# Patient Record
Sex: FEMALE | Race: WHITE | NOT HISPANIC OR LATINO | Employment: PART TIME | URBAN - METROPOLITAN AREA
[De-identification: names, ages, dates, MRNs, and addresses within clinical notes are randomized per-mention and may not be internally consistent; named-entity substitution may affect disease eponyms.]

---

## 2018-01-30 ENCOUNTER — OFFICE VISIT (OUTPATIENT)
Dept: FAMILY MEDICINE CLINIC | Facility: CLINIC | Age: 23
End: 2018-01-30
Payer: COMMERCIAL

## 2018-01-30 VITALS
WEIGHT: 135 LBS | RESPIRATION RATE: 12 BRPM | TEMPERATURE: 97.7 F | DIASTOLIC BLOOD PRESSURE: 70 MMHG | SYSTOLIC BLOOD PRESSURE: 110 MMHG | HEART RATE: 72 BPM | BODY MASS INDEX: 21.69 KG/M2 | HEIGHT: 66 IN

## 2018-01-30 DIAGNOSIS — J06.9 ACUTE URI: Primary | ICD-10-CM

## 2018-01-30 PROCEDURE — 99213 OFFICE O/P EST LOW 20 MIN: CPT | Performed by: NURSE PRACTITIONER

## 2018-01-30 RX ORDER — NORETHINDRONE ACETATE AND ETHINYL ESTRADIOL 1MG-20(21)
KIT ORAL
COMMUNITY
Start: 2015-04-09 | End: 2018-05-18 | Stop reason: HOSPADM

## 2018-01-30 NOTE — PROGRESS NOTES
Subjective     Liseth Jones is a 23 y o  female who presents for evaluation of symptoms of a URI  Symptoms include low grade fever, nasal congestion, non productive cough and fatigue  Onset of symptoms was 3 days ago and has been gradually improving since that time  Treatment to date: none  Positive sick contact=boyfriend had similar illness  The following portions of the patient's history were reviewed and updated as appropriate: allergies, current medications, past family history, past medical history, past social history, past surgical history and problem list     Review of Systems  Constitutional: positive for chills, fatigue and fevers  Ears, nose, mouth, throat, and face: positive for nasal congestion  Respiratory: positive for cough  Cardiovascular: negative  Gastrointestinal: positive for diarrhea  Integument/breast: negative  Musculoskeletal:negative, denies myalgias  Objective     /70 (BP Location: Left arm, Patient Position: Sitting, Cuff Size: Adult)   Pulse 72   Temp 97 7 °F (36 5 °C) (Temporal)   Resp 12   Ht 5' 5 5" (1 664 m)   Wt 61 2 kg (135 lb)   BMI 22 12 kg/m²   General appearance: alert and oriented, in no acute distress  Ears: abnormal TM right ear - dull and abnormal TM left ear - dull  Nose: no discharge, turbinates red, no sinus tenderness  Throat: abnormal findings: mild oropharyngeal erythema  Lungs: clear to auscultation bilaterally  Heart: regular rate and rhythm, S1, S2 normal, no murmur, click, rub or gallop  Abdomen: soft, non-tender; bowel sounds normal; no masses,  no organomegaly  Pulses: 2+ and symmetric  Lymph nodes: Cervical, supraclavicular, and axillary nodes normal     Assessment/Plan     viral upper respiratory illness  Discussed diagnosis and treatment of URI  Discussed the importance of avoiding unnecessary antibiotic therapy  Suggested symptomatic OTC remedies  Nasal saline spray for congestion  Follow up as needed    Call in 7 days if symptoms aren't resolving  Gregg Clinchco

## 2018-01-30 NOTE — LETTER
January 30, 2018     Patient: Byron Larson   YOB: 1995   Date of Visit: 1/30/2018       To Whom it May Concern:    Florence Bernstein is under my professional care  She was seen in my office on 1/30/2018  She may return to work on 2/1/18  Excused for illness 1/27, 1/30, 1/31 for illness  If you have any questions or concerns, please don't hesitate to call           Sincerely,          MARILEE Lim        CC: No Recipients

## 2018-01-30 NOTE — PATIENT INSTRUCTIONS
Upper Respiratory Infection   AMBULATORY CARE:   An upper respiratory infection  is also called a common cold  It can affect your nose, throat, ears, and sinuses  Common signs and symptoms include the following:  Cold symptoms are usually worst for the first 3 to 5 days  You may have any of the following:  · Runny or stuffy nose    · Sneezing and coughing    · Sore throat or hoarseness    · Red, watery, and sore eyes    · Fatigue     · Chills and fever    · Headache, body aches, or sore muscles  Seek care immediately if:   · You have chest pain or trouble breathing  Contact your healthcare provider if:   · You have a fever over 102ºF (39°C)  · Your sore throat gets worse or you see white or yellow spots in your throat  · Your symptoms get worse after 3 to 5 days or your cold is not better in 14 days  · You have a rash anywhere on your skin  · You have large, tender lumps in your neck  · You have thick, green or yellow drainage from your nose  · You cough up thick yellow, green, or bloody mucus  · You have vomiting for more than 24 hours and cannot keep fluids down  · You have a bad earache  · You have questions or concerns about your condition or care  Treatment for a cold: There is no cure for the common cold  Colds are caused by viruses and do not get better with antibiotics  Most people get better in 7 to 14 days  You may continue to cough for 2 to 3 weeks  The following may help decrease your symptoms:  · Decongestants  help reduce nasal congestion and help you breathe more easily  If you take decongestant pills, they may make you feel restless or not able to sleep  Do not use decongestant sprays for more than a few days  · Cough suppressants  help reduce coughing  Ask your healthcare provider which type of cough medicine is best for you  · NSAIDs , such as ibuprofen, help decrease swelling, pain, and fever   NSAIDs can cause stomach bleeding or kidney problems in certain people  If you take blood thinner medicine, always ask your healthcare provider if NSAIDs are safe for you  Always read the medicine label and follow directions  · Acetaminophen  decreases pain and fever  It is available without a doctor's order  Ask how much to take and how often to take it  Follow directions  Read the labels of all other medicines you are using to see if they also contain acetaminophen, or ask your doctor or pharmacist  Acetaminophen can cause liver damage if not taken correctly  Do not use more than 4 grams (4,000 milligrams) total of acetaminophen in one day  Manage your cold:   · Rest as much as possible  Slowly start to do more each day  · Drink more liquids as directed  Liquids will help thin and loosen mucus so you can cough it up  Liquids will also help prevent dehydration  Liquids that help prevent dehydration include water, fruit juice, and broth  Do not drink liquids that contain caffeine  Caffeine can increase your risk for dehydration  Ask your healthcare provider how much liquid to drink each day  · Soothe a sore throat  Gargle with warm salt water  This helps your sore throat feel better  Make salt water by dissolving ¼ teaspoon salt in 1 cup warm water  You may also suck on hard candy or throat lozenges  You may use a sore throat spray  · Use a humidifier or vaporizer  Use a cool mist humidifier or a vaporizer to increase air moisture in your home  This may make it easier for you to breathe and help decrease your cough  · Use saline nasal drops as directed  These help relieve congestion  · Apply petroleum-based jelly around the outside of your nostrils  This can decrease irritation from blowing your nose  · Do not smoke  Nicotine and other chemicals in cigarettes and cigars can make your symptoms worse  They can also cause infections such as bronchitis or pneumonia   Ask your healthcare provider for information if you currently smoke and need help to quit  E-cigarettes or smokeless tobacco still contain nicotine  Talk to your healthcare provider before you use these products  Prevent spreading your cold to others:   · Try to stay away from other people during the first 2 to 3 days of your cold when it is more easily spread  · Do not share food or drinks  · Do not share hand towels with household members  · Wash your hands often, especially after you blow your nose  Turn away from other people and cover your mouth and nose with a tissue when you sneeze or cough  Follow up with your healthcare provider as directed:  Write down your questions so you remember to ask them during your visits  © 2017 [facility] Information is for End User's use only and may not be sold, redistributed or otherwise used for commercial purposes  All illustrations and images included in CareNotes® are the copyrighted property of [employer]  or [address]  The above information is an  only  It is not intended as medical advice for individual conditions or treatments  Talk to your doctor, nurse or pharmacist before following any medical regimen to see if it is safe and effective for you

## 2018-05-01 ENCOUNTER — OFFICE VISIT (OUTPATIENT)
Dept: FAMILY MEDICINE CLINIC | Facility: CLINIC | Age: 23
End: 2018-05-01
Payer: COMMERCIAL

## 2018-05-01 VITALS
HEIGHT: 66 IN | WEIGHT: 125 LBS | HEART RATE: 76 BPM | TEMPERATURE: 98.3 F | SYSTOLIC BLOOD PRESSURE: 108 MMHG | DIASTOLIC BLOOD PRESSURE: 66 MMHG | BODY MASS INDEX: 20.09 KG/M2 | RESPIRATION RATE: 16 BRPM

## 2018-05-01 DIAGNOSIS — J01.40 ACUTE NON-RECURRENT PANSINUSITIS: ICD-10-CM

## 2018-05-01 DIAGNOSIS — R42 VERTIGO: Primary | ICD-10-CM

## 2018-05-01 PROCEDURE — 99213 OFFICE O/P EST LOW 20 MIN: CPT | Performed by: NURSE PRACTITIONER

## 2018-05-01 RX ORDER — MECLIZINE HCL 12.5 MG/1
12.5 TABLET ORAL EVERY 8 HOURS PRN
Qty: 24 TABLET | Refills: 0 | Status: SHIPPED | OUTPATIENT
Start: 2018-05-01 | End: 2018-05-18 | Stop reason: HOSPADM

## 2018-05-01 RX ORDER — FLUTICASONE PROPIONATE 50 MCG
2 SPRAY, SUSPENSION (ML) NASAL DAILY
Qty: 16 G | Refills: 0 | Status: SHIPPED | OUTPATIENT
Start: 2018-05-01 | End: 2018-05-16 | Stop reason: ALTCHOICE

## 2018-05-01 RX ORDER — AMOXICILLIN AND CLAVULANATE POTASSIUM 875; 125 MG/1; MG/1
1 TABLET, FILM COATED ORAL EVERY 12 HOURS SCHEDULED
Qty: 14 TABLET | Refills: 0 | Status: SHIPPED | OUTPATIENT
Start: 2018-05-01 | End: 2018-05-08

## 2018-05-01 NOTE — PROGRESS NOTES
Valerie Becerra is a 26 y o  female who presents for evaluation of dizziness  The symptoms started 5 days ago and are waxing and waning  The attacks occur intermittently and last several minutes  Positions that worsen symptoms: head back, lying down, rolling in bed to the left, rolling in bed to the right and turning head  Previous workup/treatments: none  Associated ear symptoms: ear fullness  Associated CNS symptoms: none  Recent infections: upper respiratory infection in Jan 2018  Head trauma: denied  Drug ingestion: none  Noise exposure: no occupational exposure  Family history: non-contributory  Reports hx of seasonal allergies, migraines  Menses started today  Took preg test yesterday, it was negative    The following portions of the patient's history were reviewed and updated as appropriate: allergies, current medications, past family history, past medical history, past social history, past surgical history and problem list     Review of Systems  Constitutional: positive for chills and fatigue  Eyes: negative  Ears, nose, mouth, throat, and face: positive for nasal congestion and ear fullness  Respiratory: negative  Gastrointestinal: positive for nausea and vomiting  Neurological: positive for coordination problems and headaches  Allergic/Immunologic: positive for hay fever      Objective     /66 (BP Location: Left arm, Patient Position: Sitting, Cuff Size: Adult)   Pulse 76   Temp 98 3 °F (36 8 °C) (Temporal)   Resp 16   Ht 5' 5 5" (1 664 m)   Wt 56 7 kg (125 lb)   BMI 20 48 kg/m²   General appearance: alert and oriented, in no acute distress  Head: Normocephalic, without obvious abnormality, sinuses tender to percussion  Eyes: positive findings: nystagimus  Ears: abnormal TM right ear - dull and abnormal TM left ear - dull  Nose: no discharge, turbinates red, swollen, inflamed  Throat: abnormal findings: mild oropharyngeal erythema  Lungs: clear to auscultation bilaterally  Heart: regular rate and rhythm, S1, S2 normal, no murmur, click, rub or gallop  Abdomen: soft, non-tender; bowel sounds normal; no masses,  no organomegaly  Extremities: extremities normal, warm and well-perfused; no cyanosis, clubbing, or edema  Pulses: 2+ and symmetric  Lymph nodes: Cervical adenopathy: present  Neurologic: Coordination: normal except + romberg  Gait: Normal     Assessment/Plan     vertigo with symptoms of allergic rhinitis/sinusitis  Meclizine per medication orders  OTC decongestants  The nature of vertigo syndromes were discussed along with their usual course and treatment  Educational materials given and questions answered  Empiric antibiotic for sinusitis  No work/driving today  Drink plenty of water  Slow position change  Avoid salty foods, alcohol, caffeine  Change positions slowly  Call tomorrow with update of symptoms  Patient declines blood work, imaging to further eval for cause d/t uninsured status  Advised ER if sxs progress as discussed

## 2018-05-01 NOTE — LETTER
May 1, 2018     Patient: Otilia Chand   YOB: 1995   Date of Visit: 5/1/2018       To Whom it May Concern:    Salvatore Linder is under my professional care  She was seen in my office on 5/1/2018  She may return to work on 5/3/18 if symptoms improve  If you have any questions or concerns, please don't hesitate to call           Sincerely,          MARILEE Tinoco        CC: No Recipients

## 2018-05-01 NOTE — PATIENT INSTRUCTIONS
Benign Paroxysmal Positional Vertigo   WHAT YOU NEED TO KNOW:   What is benign paroxysmal positional vertigo (BPPV)? BPPV is an inner ear condition that causes you to suddenly feel dizzy  Benign means it is not serious or life-threatening  BPPV is caused by a problem with the nerves and structure of your inner ear  BPPV happens when small pieces of calcium break loose and lump together in one of your inner ear canals  What are the signs and symptoms of BPPV? You may feel that you or the room is moving or spinning  Turning your head, rolling over in bed, getting up or lying down may lead to sudden vertigo  You may also have any of the following symptoms:  · Nystagmus (quick shaky eye movement that you cannot control)    · Nausea    · Poor balance and feeling unsteady when you walk  What increases my risk for BPPV? · Older age    · An injury or trauma to your head or neck    · Frequent ear infections    · Long-term bed rest    · A medical condition such as diabetes, high blood pressure, migraine headaches, or Ménière disease  How is BPPV diagnosed? Your healthcare provider will ask about your symptoms and examine you  Your healthcare provider can usually determine if you have BPPV by doing a few simple tests  He or she may have you move your head or body in certain ways  Tell him or her if you feel dizzy or nauseated during these movements  How is BPPV managed? · Your healthcare provider will teach you how to move your head and body to prevent symptoms  For example, he or she may teach you certain ways to move your head or body  These movements usually help relieve your symptoms and keep the dizziness from returning  The exercises help move the calcium pieces to a different part of your ear  Do the movements only as directed  · Vestibular and balance rehabilitation therapy (VBRT)  is used to teach you exercises to improve your balance and strength   VBRT may help decrease your dizziness and prevent injuries if you are at risk for falls  · Medicines  may be recommended or prescribed to treat dizziness or nausea  How can I help prevent my symptoms? · Try to avoid sudden head movements  Stand up and lie down slowly  · Raise and support your head when you lie down  Place pillows under your upper back and head or rest in a recliner  · Change your position often when you are lying down  Try not to lie with your head on the same side for long periods of time  Roll over slowly  · Wear protective gear  when you ride a bike or play sports  A helmet helps protect your head from injury  When should I seek immediate care? · You fall during a BPPV episode and are injured  · You have a severe headache that does not go away  · You have new changes in your vision or feel weak or confused  · You have problems hearing, or you have ringing or buzzing in your ears  When should I contact my healthcare provider? · Your BPPV symptoms do not go away or they return  · You have problems with your balance, or you are falling often  · You have new or increased nausea or vomiting with vertigo  · You feel anxious or depressed and do not want to leave your home  · You have questions or concerns about your condition or care  CARE AGREEMENT:   You have the right to help plan your care  Learn about your health condition and how it may be treated  Discuss treatment options with your caregivers to decide what care you want to receive  You always have the right to refuse treatment  The above information is an  only  It is not intended as medical advice for individual conditions or treatments  Talk to your doctor, nurse or pharmacist before following any medical regimen to see if it is safe and effective for you  © 2017 [employer] Information is for End User's use only and may not be sold, redistributed or otherwise used for commercial purposes   All illustrations and images included in CareNotes® are the copyrighted property of [employer]  or [employer]

## 2018-05-02 ENCOUNTER — TELEPHONE (OUTPATIENT)
Dept: FAMILY MEDICINE CLINIC | Facility: CLINIC | Age: 23
End: 2018-05-02

## 2018-05-02 NOTE — TELEPHONE ENCOUNTER
Pt called with update of sx - the meclizine is helping, however dizziness returns if not on medication,  or if med wares off  Pt states she feels it when she stands up and is still swaying  I can call pt if you have any other suggestions [phone], told her we can extend note if not ready to go back tomorrow  Pt has not been driving

## 2018-05-03 NOTE — TELEPHONE ENCOUNTER
Patient called back and said that her symptoms are not getting worse but she still had to leave work early today because she wasn't feeling well  She said that the medicine is helping with the dizziness but not with the light headedness

## 2018-05-03 NOTE — TELEPHONE ENCOUNTER
Left message on voice mail  Requesting call back to update me on symptoms  I advised that we need to do further testing if symptoms are unchanged/worsening   Will await call back

## 2018-05-04 NOTE — TELEPHONE ENCOUNTER
If Maninder Peterson is not feeling better by Monday, I would like her to return for f/u, labs  Labs can be done at a discount at [facility]  Please write work note if she needs one   TY

## 2018-05-07 ENCOUNTER — TELEPHONE (OUTPATIENT)
Dept: FAMILY MEDICINE CLINIC | Facility: CLINIC | Age: 23
End: 2018-05-07

## 2018-05-07 DIAGNOSIS — R51.9 ACUTE INTRACTABLE HEADACHE, UNSPECIFIED HEADACHE TYPE: Primary | ICD-10-CM

## 2018-05-08 RX ORDER — METHYLPREDNISOLONE 4 MG/1
TABLET ORAL
Qty: 21 TABLET | Refills: 0 | Status: SHIPPED | OUTPATIENT
Start: 2018-05-08 | End: 2018-05-16 | Stop reason: ALTCHOICE

## 2018-05-08 NOTE — TELEPHONE ENCOUNTER
Rx for medrol ladan sent to pharm  It is a steroid  She needs to follow instructions and must finish  Have her read information from pharmacy   If no better after 3 days, she needs f/u

## 2018-05-16 ENCOUNTER — HOSPITAL ENCOUNTER (INPATIENT)
Facility: HOSPITAL | Age: 23
LOS: 2 days | Discharge: HOME | DRG: 543 | End: 2018-05-18
Attending: EMERGENCY MEDICINE | Admitting: INTERNAL MEDICINE
Payer: COMMERCIAL

## 2018-05-16 ENCOUNTER — TELEPHONE (OUTPATIENT)
Dept: FAMILY MEDICINE CLINIC | Facility: CLINIC | Age: 23
End: 2018-05-16

## 2018-05-16 ENCOUNTER — APPOINTMENT (EMERGENCY)
Dept: RADIOLOGY | Facility: HOSPITAL | Age: 23
DRG: 543 | End: 2018-05-16
Payer: COMMERCIAL

## 2018-05-16 DIAGNOSIS — R10.9 ABDOMINAL PAIN: ICD-10-CM

## 2018-05-16 DIAGNOSIS — I26.99 PULMONARY EMBOLI (HCC): Primary | ICD-10-CM

## 2018-05-16 DIAGNOSIS — I26.99 BILATERAL PULMONARY EMBOLISM (HCC): ICD-10-CM

## 2018-05-16 PROBLEM — D72.829 LEUKOCYTOSIS: Status: ACTIVE | Noted: 2018-05-16

## 2018-05-16 PROBLEM — E87.1 HYPONATREMIA: Status: ACTIVE | Noted: 2018-05-16

## 2018-05-16 PROBLEM — R65.10 SIRS (SYSTEMIC INFLAMMATORY RESPONSE SYNDROME) (HCC): Status: ACTIVE | Noted: 2018-05-16

## 2018-05-16 PROBLEM — R42 DIZZINESS: Status: ACTIVE | Noted: 2018-05-16

## 2018-05-16 LAB
ALBUMIN SERPL BCP-MCNC: 3.2 G/DL (ref 3.5–5)
ALP SERPL-CCNC: 80 U/L (ref 46–116)
ALT SERPL W P-5'-P-CCNC: 38 U/L (ref 12–78)
ANION GAP SERPL CALCULATED.3IONS-SCNC: 5 MMOL/L (ref 4–13)
AST SERPL W P-5'-P-CCNC: 18 U/L (ref 5–45)
BACTERIA UR QL AUTO: ABNORMAL /HPF
BASOPHILS # BLD MANUAL: 0 THOUSAND/UL (ref 0–0.1)
BASOPHILS NFR MAR MANUAL: 0 % (ref 0–1)
BILIRUB SERPL-MCNC: 0.5 MG/DL (ref 0.2–1)
BILIRUB UR QL STRIP: NEGATIVE
BUN SERPL-MCNC: 10 MG/DL (ref 5–25)
CALCIUM SERPL-MCNC: 8.8 MG/DL (ref 8.3–10.1)
CHLORIDE SERPL-SCNC: 99 MMOL/L (ref 100–108)
CLARITY UR: CLEAR
CO2 SERPL-SCNC: 29 MMOL/L (ref 21–32)
COLOR UR: YELLOW
CREAT SERPL-MCNC: 0.91 MG/DL (ref 0.6–1.3)
EOSINOPHIL # BLD MANUAL: 0 THOUSAND/UL (ref 0–0.4)
EOSINOPHIL NFR BLD MANUAL: 0 % (ref 0–6)
ERYTHROCYTE [DISTWIDTH] IN BLOOD BY AUTOMATED COUNT: 12.6 % (ref 11.6–15.1)
EXT PREG TEST URINE: NEGATIVE
GFR SERPL CREATININE-BSD FRML MDRD: 90 ML/MIN/1.73SQ M
GLUCOSE SERPL-MCNC: 95 MG/DL (ref 65–140)
GLUCOSE UR STRIP-MCNC: NEGATIVE MG/DL
HCT VFR BLD AUTO: 43.9 % (ref 34.8–46.1)
HGB BLD-MCNC: 14.3 G/DL (ref 11.5–15.4)
HGB UR QL STRIP.AUTO: ABNORMAL
KETONES UR STRIP-MCNC: NEGATIVE MG/DL
LEUKOCYTE ESTERASE UR QL STRIP: ABNORMAL
LYMPHOCYTES # BLD AUTO: 17 % (ref 14–44)
LYMPHOCYTES # BLD AUTO: 2 THOUSAND/UL (ref 0.6–4.47)
MCH RBC QN AUTO: 30.4 PG (ref 26.8–34.3)
MCHC RBC AUTO-ENTMCNC: 32.6 G/DL (ref 31.4–37.4)
MCV RBC AUTO: 93 FL (ref 82–98)
MONOCYTES # BLD AUTO: 1.18 THOUSAND/UL (ref 0–1.22)
MONOCYTES NFR BLD: 10 % (ref 4–12)
MYELOCYTES NFR BLD MANUAL: 1 % (ref 0–1)
NEUTROPHILS # BLD MANUAL: 7.3 THOUSAND/UL (ref 1.85–7.62)
NEUTS BAND NFR BLD MANUAL: 5 % (ref 0–8)
NEUTS SEG NFR BLD AUTO: 57 % (ref 43–75)
NITRITE UR QL STRIP: NEGATIVE
NON-SQ EPI CELLS URNS QL MICRO: ABNORMAL /HPF
NRBC BLD AUTO-RTO: 0 /100 WBCS
PH UR STRIP.AUTO: 6.5 [PH] (ref 5–9)
PLATELET # BLD AUTO: 186 THOUSANDS/UL (ref 149–390)
PLATELET BLD QL SMEAR: ADEQUATE
PMV BLD AUTO: 10 FL (ref 8.9–12.7)
POLYCHROMASIA BLD QL SMEAR: PRESENT
POTASSIUM SERPL-SCNC: 4.4 MMOL/L (ref 3.5–5.3)
PROT SERPL-MCNC: 7.2 G/DL (ref 6.4–8.2)
PROT UR STRIP-MCNC: NEGATIVE MG/DL
RBC # BLD AUTO: 4.7 MILLION/UL (ref 3.81–5.12)
RBC #/AREA URNS AUTO: ABNORMAL /HPF
SODIUM SERPL-SCNC: 133 MMOL/L (ref 136–145)
SP GR UR STRIP.AUTO: <=1.005 (ref 1–1.03)
TOTAL CELLS COUNTED SPEC: 100
TSH SERPL DL<=0.05 MIU/L-ACNC: 0.5 UIU/ML (ref 0.36–3.74)
UROBILINOGEN UR QL STRIP.AUTO: 0.2 E.U./DL
VARIANT LYMPHS # BLD AUTO: 10 %
WBC # BLD AUTO: 11.77 THOUSAND/UL (ref 4.31–10.16)
WBC #/AREA URNS AUTO: ABNORMAL /HPF

## 2018-05-16 PROCEDURE — 86618 LYME DISEASE ANTIBODY: CPT | Performed by: EMERGENCY MEDICINE

## 2018-05-16 PROCEDURE — 36415 COLL VENOUS BLD VENIPUNCTURE: CPT | Performed by: EMERGENCY MEDICINE

## 2018-05-16 PROCEDURE — 84443 ASSAY THYROID STIM HORMONE: CPT | Performed by: EMERGENCY MEDICINE

## 2018-05-16 PROCEDURE — 71275 CT ANGIOGRAPHY CHEST: CPT

## 2018-05-16 PROCEDURE — 81025 URINE PREGNANCY TEST: CPT | Performed by: EMERGENCY MEDICINE

## 2018-05-16 PROCEDURE — 96360 HYDRATION IV INFUSION INIT: CPT

## 2018-05-16 PROCEDURE — 94640 AIRWAY INHALATION TREATMENT: CPT

## 2018-05-16 PROCEDURE — 70450 CT HEAD/BRAIN W/O DYE: CPT

## 2018-05-16 PROCEDURE — 80053 COMPREHEN METABOLIC PANEL: CPT | Performed by: EMERGENCY MEDICINE

## 2018-05-16 PROCEDURE — 81001 URINALYSIS AUTO W/SCOPE: CPT | Performed by: STUDENT IN AN ORGANIZED HEALTH CARE EDUCATION/TRAINING PROGRAM

## 2018-05-16 PROCEDURE — 99285 EMERGENCY DEPT VISIT HI MDM: CPT

## 2018-05-16 PROCEDURE — 85027 COMPLETE CBC AUTOMATED: CPT | Performed by: EMERGENCY MEDICINE

## 2018-05-16 PROCEDURE — 93005 ELECTROCARDIOGRAM TRACING: CPT

## 2018-05-16 PROCEDURE — 99222 1ST HOSP IP/OBS MODERATE 55: CPT | Performed by: STUDENT IN AN ORGANIZED HEALTH CARE EDUCATION/TRAINING PROGRAM

## 2018-05-16 PROCEDURE — 87081 CULTURE SCREEN ONLY: CPT | Performed by: INTERNAL MEDICINE

## 2018-05-16 PROCEDURE — 85007 BL SMEAR W/DIFF WBC COUNT: CPT | Performed by: EMERGENCY MEDICINE

## 2018-05-16 RX ORDER — ACETAMINOPHEN 325 MG/1
650 TABLET ORAL EVERY 6 HOURS PRN
Status: DISCONTINUED | OUTPATIENT
Start: 2018-05-16 | End: 2018-05-17

## 2018-05-16 RX ORDER — ALBUTEROL SULFATE 2.5 MG/3ML
2.5 SOLUTION RESPIRATORY (INHALATION) ONCE
Status: COMPLETED | OUTPATIENT
Start: 2018-05-16 | End: 2018-05-16

## 2018-05-16 RX ADMIN — ENOXAPARIN SODIUM 60 MG: 80 INJECTION SUBCUTANEOUS at 19:30

## 2018-05-16 RX ADMIN — ACETAMINOPHEN 650 MG: 325 TABLET, FILM COATED ORAL at 22:13

## 2018-05-16 RX ADMIN — SODIUM CHLORIDE 1000 ML: 0.9 INJECTION, SOLUTION INTRAVENOUS at 15:41

## 2018-05-16 RX ADMIN — IOHEXOL 85 ML: 350 INJECTION, SOLUTION INTRAVENOUS at 17:37

## 2018-05-16 RX ADMIN — ALBUTEROL SULFATE 2.5 MG: 2.5 SOLUTION RESPIRATORY (INHALATION) at 15:45

## 2018-05-16 NOTE — TELEPHONE ENCOUNTER
Alek Gordillo    Patient stopped sterooids on Monday  She still feels lightheaded when she walks and chest feels tight, shortness of breath, but it only happens when she walks  Nurse at work checked her pulse last night  and it was in the 100's  Please advise

## 2018-05-16 NOTE — LETTER
[address] [address] [place] [address]  Dept: [phone]    May 18, 2018     Patient: Enrique Palmer   YOB: 1995   Date of Visit: 5/16/2018       To Whom it May Concern:    Jose Shahid is under my professional care  She was seen in the hospital from 5/16/2018   to 05/18/18  She may return to work on May 28, 2018  If you have any questions or concerns, please don't hesitate to call           Sincerely,          El Dahl MD

## 2018-05-16 NOTE — ED PROVIDER NOTES
History  Chief Complaint   Patient presents with    Dizziness     Pt had episodes of dizziness a few weeks ago, was dx vertigo and a sinus infection  Pt took amoxicillin and meclizine which the meclizine helped  Pt was then given steroids, but that didn't seem to help either  Currently c/o lightheadedness, headache and when she walks she can't breathe  Patient is a 61-year-old female who presents with a complaint of approximately 2 weeks ago started having episodes of room spinning around dizziness feeling off balance and generalized malaise  Patient with her primary care doctor was diagnosed with a sinus infection even though she had no complaints of sinus congestion or nasal discharge  The patient was given a course of amoxicillin and put on Antivert and given a nasal spray  Patient states that the room spinning around dizziness has improved  He does state though that she continues to be lightheaded whenever she stands and recently in the last few days she is being becoming tachycardic and having dyspnea on exertion  Patient is also complaining of frequent left-sided headaches around her temple area but she has had a history of migraines in the past   Patient does states she hears herself wheezing when she is exerting herself, she had a childhood asthma diagnosis but has not had to use inhalers for many years  Patient is on birth control pills but she has not had any lower extremity swelling or pain  Patient is also complaining about several weeks of bilateral lower back pain but is nonradiating and is not necessary related to any of her other symptoms  Prior to Admission Medications   Prescriptions Last Dose Informant Patient Reported?  Taking?   meclizine (ANTIVERT) 12 5 MG tablet Past Week at Unknown time  No Yes   Sig: Take 1 tablet (12 5 mg total) by mouth every 8 (eight) hours as needed for dizziness   norethindrone-ethinyl estradiol (GILDESS FE 1/20) 1-20 MG-MCG per tablet 5/15/2018 at Unknown time  Yes Yes   Sig: Take by mouth      Facility-Administered Medications: None       History reviewed  No pertinent past medical history  History reviewed  No pertinent surgical history  History reviewed  No pertinent family history  I have reviewed and agree with the history as documented  Social History   Substance Use Topics    Smoking status: Never Smoker    Smokeless tobacco: Never Used    Alcohol use Yes      Comment: socially        Review of Systems   Constitutional: Positive for fatigue  Negative for chills and fever  HENT: Negative for drooling, facial swelling, postnasal drip, rhinorrhea, sinus pain, sinus pressure and trouble swallowing  Eyes: Negative for photophobia, pain and visual disturbance  Respiratory: Positive for chest tightness, shortness of breath and wheezing  Cardiovascular: Positive for palpitations  Negative for chest pain and leg swelling  Gastrointestinal: Negative for abdominal pain, constipation, nausea and vomiting  Genitourinary: Negative for dysuria and flank pain  Musculoskeletal: Positive for back pain  Negative for neck pain and neck stiffness  Skin: Negative  Neurological: Positive for dizziness, light-headedness and headaches  Negative for seizures, syncope, facial asymmetry, speech difficulty and numbness  Hematological: Negative  Psychiatric/Behavioral: Negative          Physical Exam  ED Triage Vitals [05/16/18 1453]   Temperature Pulse Respirations Blood Pressure SpO2   99 4 °F (37 4 °C) 102 16 116/66 100 %      Temp Source Heart Rate Source Patient Position - Orthostatic VS BP Location FiO2 (%)   Oral Monitor Sitting Left arm --      Pain Score       3           Orthostatic Vital Signs  Vitals:    05/16/18 1638 05/16/18 1640 05/16/18 1641 05/16/18 1745   BP: 113/56 115/57 99/55 113/60   Pulse: (!) 108 (!) 111 (!) 127 101   Patient Position - Orthostatic VS: Lying Sitting Standing        Physical Exam   Constitutional: She is oriented to person, place, and time  She appears well-developed and well-nourished  She appears distressed  HENT:   Head: Normocephalic and atraumatic  Eyes: EOM are normal  Pupils are equal, round, and reactive to light  Neck: Normal range of motion  Neck supple  Cardiovascular: Normal rate and regular rhythm  Pulmonary/Chest: Breath sounds normal  Tachypnea noted  Abdominal: Soft  Bowel sounds are normal  She exhibits no distension  There is no tenderness  Musculoskeletal: Normal range of motion  She exhibits no edema  Neurological: She is alert and oriented to person, place, and time  Skin: Skin is warm and dry  Psychiatric: She has a normal mood and affect  Nursing note and vitals reviewed        ED Medications  Medications   iodixanol (VISIPAQUE) 320 MG/ML injection 100 mL (not administered)   sodium chloride 0 9 % bolus 1,000 mL (0 mL Intravenous Stopped 5/16/18 1641)   albuterol inhalation solution 2 5 mg (2 5 mg Nebulization Given 5/16/18 1545)   iohexol (OMNIPAQUE) 350 MG/ML injection (MULTI-DOSE) 85 mL (85 mL Intravenous Given 5/16/18 1737)       Diagnostic Studies  Results Reviewed     Procedure Component Value Units Date/Time    POCT pregnancy, urine [[id number]]  (Normal) Resulted:  05/16/18 1724    Lab Status:  Final result Updated:  05/16/18 1724     EXT PREG TEST UR (Ref: Negative) negative    Comprehensive metabolic panel [[id number]]  (Abnormal) Collected:  05/16/18 1540    Lab Status:  Final result Specimen:  Blood from Arm, Right Updated:  05/16/18 1624     Sodium 133 (L) mmol/L      Potassium 4 4 mmol/L      Chloride 99 (L) mmol/L      CO2 29 mmol/L      Anion Gap 5 mmol/L      BUN 10 mg/dL      Creatinine 0 91 mg/dL      Glucose 95 mg/dL      Calcium 8 8 mg/dL      AST 18 U/L      ALT 38 U/L      Alkaline Phosphatase 80 U/L      Total Protein 7 2 g/dL      Albumin 3 2 (L) g/dL      Total Bilirubin 0 50 mg/dL      eGFR 90 ml/min/1 73sq m     Narrative:         Consolidated [place] [employer] Education [employer] recommendations are as follows:  GFR calculation is accurate only with a steady state creatinine  Chronic Kidney disease less than 60 ml/min/1 73 sq  meters  Kidney failure less than 15 ml/min/1 73 sq  meters  TSH [[id number]]  (Normal) Collected:  05/16/18 1540    Lab Status:  Final result Specimen:  Blood from Arm, Right Updated:  05/16/18 1624     TSH 3RD GENERATON 0 498 uIU/mL     Narrative:         Patients undergoing fluorescein dye angiography may retain small amounts of fluorescein in the body for 48-72 hours post procedure  Samples containing fluorescein can produce falsely depressed TSH values  If the patient had this procedure,a specimen should be resubmitted post fluorescein clearance  The recommended reference ranges for TSH during pregnancy are as follows:  First trimester 0 1 to 2 5 uIU/mL  Second trimester  0 2 to 3 0 uIU/mL  Third trimester 0 3 to 3 0 uIU/m      CBC and differential [[id number]]  (Abnormal) Collected:  05/16/18 1540    Lab Status:  Final result Specimen:  Blood from Arm, Right Updated:  05/16/18 1611     WBC 11 77 (H) Thousand/uL      RBC 4 70 Million/uL      Hemoglobin 14 3 g/dL      Hematocrit 43 9 %      MCV 93 fL      MCH 30 4 pg      MCHC 32 6 g/dL      RDW 12 6 %      MPV 10 0 fL      Platelets 321 Thousands/uL      nRBC 0 /100 WBCs     Narrative: This is an appended report  These results have been appended to a previously verified report  Lyme Antibody Profile with reflex to [facility] [[id number]] Collected:  05/16/18 1540    Lab Status: In process Specimen:  Blood from Arm, Right Updated:  05/16/18 7435                 CTA ED chest PE study   Final Result by Lorna Manzanares MD (05/16 4447)      Large quantity of right and small quantity of left acute pulmonary arterial embolism                  I personally discussed this study with Opal Richardson on 5/16/2018 at 5:53 PM                         Workstation performed: [id number] CT head without contrast   Final Result by Arias Thomas MD (05/16 7332)      No acute intracranial abnormality  Workstation performed: [id number]                    Procedures  ECG 12 Lead Documentation  Date/Time: 5/16/2018 3:00 PM  Performed by: Haley Gaines by: Star Corral     Indications / Diagnosis:  Shortness of breath  ECG reviewed by me, the ED Provider: yes    Patient location:  ED  Previous ECG:     Previous ECG:  Unavailable    Comparison to cardiac monitor: Yes    Interpretation:     Interpretation: normal    Rate:     ECG rate:  92    ECG rate assessment: normal    Rhythm:     Rhythm: sinus rhythm    Ectopy:     Ectopy: none    QRS:     QRS axis:  Normal  Conduction:     Conduction: normal    ST segments:     ST segments:  Normal  T waves:     T waves: normal             Phone Contacts  ED Phone Contact    ED Course                               MDM  Number of Diagnoses or Management Options  Diagnosis management comments: Patient's CT scan of her chest showed significant pulmonary embolism on the right and a small quantity on the left  Patient's CT scan head the rest of blood work are normal  Patient's daily need to be admitted to the hospitalist and started on anticoagulation  Patient and family state understanding and agreement with the assessment plan  Amount and/or Complexity of Data Reviewed  Clinical lab tests: ordered and reviewed  Tests in the radiology section of CPT®: ordered and reviewed      The patient presented with a condition in which there was a high probability of imminent or life-threatening deterioration, and critical care services (excluding separately billable procedures) totalled 30-74 minutes          Disposition  Final diagnoses:   Pulmonary emboli ([address] )     Time reflects when diagnosis was documented in both MDM as applicable and the Disposition within this note     Time User Action Codes Description Comment    5/16/2018  6:08 PM Rasheed Granados Add [I26 99] Pulmonary emboli [facility])       ED Disposition     ED Disposition Condition Comment    Admit  Case was discussed with Verena and the patient's admission status was agreed to be Admission Status: inpatient status to the service of Dr Curiel Early   Follow-up Information    None       Patient's Medications   Discharge Prescriptions    No medications on file     No discharge procedures on file      ED Provider  Electronically Signed by           Emanuel Franks MD  05/16/18 5366

## 2018-05-16 NOTE — TELEPHONE ENCOUNTER
Pt  Doesn't have insurance advised to go to ER for eval can apply for [facility] MARIIA Evans

## 2018-05-16 NOTE — ED NOTES
ORTHOSTATIC VITAL SIGNS BLOOD PRESSURE HEART RATE           LYING 113/56 108           SITTING 115/57 111           STANDING 99/55 1221 Renetta Martinez RN  05/16/18 7950

## 2018-05-17 ENCOUNTER — APPOINTMENT (INPATIENT)
Dept: RADIOLOGY | Facility: HOSPITAL | Age: 23
DRG: 543 | End: 2018-05-17
Attending: INTERNAL MEDICINE
Payer: COMMERCIAL

## 2018-05-17 ENCOUNTER — APPOINTMENT (INPATIENT)
Dept: NON INVASIVE DIAGNOSTICS | Facility: HOSPITAL | Age: 23
DRG: 543 | End: 2018-05-17
Payer: COMMERCIAL

## 2018-05-17 ENCOUNTER — APPOINTMENT (INPATIENT)
Dept: RADIOLOGY | Facility: HOSPITAL | Age: 23
DRG: 543 | End: 2018-05-17
Attending: STUDENT IN AN ORGANIZED HEALTH CARE EDUCATION/TRAINING PROGRAM
Payer: COMMERCIAL

## 2018-05-17 LAB
ANION GAP SERPL CALCULATED.3IONS-SCNC: 4 MMOL/L (ref 4–13)
B BURGDOR IGG SER IA-ACNC: 0.16
B BURGDOR IGM SER IA-ACNC: 0.75
BASOPHILS # BLD AUTO: 0.03 THOUSANDS/ΜL (ref 0–0.1)
BASOPHILS NFR BLD AUTO: 0 % (ref 0–1)
BUN SERPL-MCNC: 6 MG/DL (ref 5–25)
CALCIUM SERPL-MCNC: 8.4 MG/DL (ref 8.3–10.1)
CHLORIDE SERPL-SCNC: 103 MMOL/L (ref 100–108)
CO2 SERPL-SCNC: 29 MMOL/L (ref 21–32)
CREAT SERPL-MCNC: 0.78 MG/DL (ref 0.6–1.3)
EOSINOPHIL # BLD AUTO: 0.32 THOUSAND/ΜL (ref 0–0.61)
EOSINOPHIL NFR BLD AUTO: 3 % (ref 0–6)
ERYTHROCYTE [DISTWIDTH] IN BLOOD BY AUTOMATED COUNT: 12.8 % (ref 11.6–15.1)
GFR SERPL CREATININE-BSD FRML MDRD: 109 ML/MIN/1.73SQ M
GLUCOSE SERPL-MCNC: 108 MG/DL (ref 65–140)
HCT VFR BLD AUTO: 39.8 % (ref 34.8–46.1)
HGB BLD-MCNC: 12.8 G/DL (ref 11.5–15.4)
IMM GRANULOCYTES # BLD AUTO: 0.04 THOUSAND/UL (ref 0–0.2)
IMM GRANULOCYTES NFR BLD AUTO: 0 % (ref 0–2)
LYMPHOCYTES # BLD AUTO: 3.01 THOUSANDS/ΜL (ref 0.6–4.47)
LYMPHOCYTES NFR BLD AUTO: 31 % (ref 14–44)
MCH RBC QN AUTO: 30.6 PG (ref 26.8–34.3)
MCHC RBC AUTO-ENTMCNC: 32.2 G/DL (ref 31.4–37.4)
MCV RBC AUTO: 95 FL (ref 82–98)
MONOCYTES # BLD AUTO: 1.14 THOUSAND/ΜL (ref 0.17–1.22)
MONOCYTES NFR BLD AUTO: 12 % (ref 4–12)
NEUTROPHILS # BLD AUTO: 5.16 THOUSANDS/ΜL (ref 1.85–7.62)
NEUTS SEG NFR BLD AUTO: 54 % (ref 43–75)
NRBC BLD AUTO-RTO: 0 /100 WBCS
PLATELET # BLD AUTO: 167 THOUSANDS/UL (ref 149–390)
PMV BLD AUTO: 10.4 FL (ref 8.9–12.7)
POTASSIUM SERPL-SCNC: 4.4 MMOL/L (ref 3.5–5.3)
RBC # BLD AUTO: 4.18 MILLION/UL (ref 3.81–5.12)
SODIUM SERPL-SCNC: 136 MMOL/L (ref 136–145)
WBC # BLD AUTO: 9.7 THOUSAND/UL (ref 4.31–10.16)

## 2018-05-17 PROCEDURE — 99255 IP/OBS CONSLTJ NEW/EST HI 80: CPT | Performed by: INTERNAL MEDICINE

## 2018-05-17 PROCEDURE — 93306 TTE W/DOPPLER COMPLETE: CPT | Performed by: INTERNAL MEDICINE

## 2018-05-17 PROCEDURE — 87086 URINE CULTURE/COLONY COUNT: CPT | Performed by: INTERNAL MEDICINE

## 2018-05-17 PROCEDURE — 99232 SBSQ HOSP IP/OBS MODERATE 35: CPT | Performed by: INTERNAL MEDICINE

## 2018-05-17 PROCEDURE — 93970 EXTREMITY STUDY: CPT

## 2018-05-17 PROCEDURE — 93970 EXTREMITY STUDY: CPT | Performed by: SURGERY

## 2018-05-17 PROCEDURE — 93306 TTE W/DOPPLER COMPLETE: CPT

## 2018-05-17 PROCEDURE — 93978 VASCULAR STUDY: CPT | Performed by: SURGERY

## 2018-05-17 PROCEDURE — 93978 VASCULAR STUDY: CPT

## 2018-05-17 PROCEDURE — 80048 BASIC METABOLIC PNL TOTAL CA: CPT | Performed by: STUDENT IN AN ORGANIZED HEALTH CARE EDUCATION/TRAINING PROGRAM

## 2018-05-17 PROCEDURE — 85025 COMPLETE CBC W/AUTO DIFF WBC: CPT | Performed by: STUDENT IN AN ORGANIZED HEALTH CARE EDUCATION/TRAINING PROGRAM

## 2018-05-17 RX ORDER — ACETAMINOPHEN 325 MG/1
650 TABLET ORAL EVERY 6 HOURS PRN
Status: DISCONTINUED | OUTPATIENT
Start: 2018-05-17 | End: 2018-05-18 | Stop reason: HOSPADM

## 2018-05-17 RX ADMIN — ACETAMINOPHEN 650 MG: 325 TABLET, FILM COATED ORAL at 07:42

## 2018-05-17 RX ADMIN — CEFTRIAXONE 1000 MG: 1 INJECTION, SOLUTION INTRAVENOUS at 02:24

## 2018-05-17 RX ADMIN — ACETAMINOPHEN 650 MG: 325 TABLET, FILM COATED ORAL at 16:11

## 2018-05-17 RX ADMIN — ENOXAPARIN SODIUM 60 MG: 60 INJECTION SUBCUTANEOUS at 21:27

## 2018-05-17 RX ADMIN — ENOXAPARIN SODIUM 60 MG: 60 INJECTION SUBCUTANEOUS at 08:37

## 2018-05-17 NOTE — ASSESSMENT & PLAN NOTE
Fever and tachycardia likely secondary to PE  Patient is on empiric IV Rocephin for UTI  Follow-up urine cultures

## 2018-05-17 NOTE — ASSESSMENT & PLAN NOTE
PT is on birth control however denies any hx of smoking  Denies any family hx of DVT/PE  Patient will be continued on Lovenox 1 milligram/kilogram subcu b i d   Given all options the patient's including Coumadin on noval anticoagulants  Patient opting mostly for 1 of the NOACs   Lower extremity venous Doppler showed extensive DVT in the common femoral, iliac veins  Await 2D echo

## 2018-05-17 NOTE — CASE MANAGEMENT
Initial Clinical Review    Admission: Date/Time/Statement: 5/16/18 @ 1828     Orders Placed This Encounter   Procedures    Inpatient Admission (expected length of stay for this patient is greater than two midnights)     Standing Status:   Standing     Number of Occurrences:   1     Order Specific Question:   Admitting Physician     Answer:   Ganga Clark     Order Specific Question:   Level of Care     Answer:   Med Surg [16]     Order Specific Question:   Estimated length of stay     Answer:   More than 2 Midnights     Order Specific Question:   Certification     Answer:   I certify that inpatient services are medically necessary for this patient for a duration of greater than two midnights  See H&P and MD Progress Notes for additional information about the patient's course of treatment  ED: Date/Time/Mode of Arrival:   ED Arrival Information     Expected Arrival Acuity Means of Arrival Escorted By Service Admission Type    - 5/16/2018 14:00 Urgent Walk-In Self General Medicine Urgent    Arrival Complaint    Diff Standing - feels faint  Diff Breathing          Chief Complaint:   Chief Complaint   Patient presents with    Dizziness     Pt had episodes of dizziness a few weeks ago, was dx vertigo and a sinus infection  Pt took amoxicillin and meclizine which the meclizine helped  Pt was then given steroids, but that didn't seem to help either  Currently c/o lightheadedness, headache and when she walks she can't breathe  History of Illness: Arcelia Perez is a 26 y o  female with a PMH of Raynaud's Disease who presents with chest pain, shortness of breath and dizziness  She states that she was in her usual state of health until 3 weeks ago when she began to have dizziness and lightheadedness  Shew as started on Meclizine and Amoxicillin for suspected Sinusitis  Her dizziness did improve with the Meclizine however did not subside entirely    Yesterday she states that she began to have chest pain and shortness of breath with exertion  Today her symptoms worsened and therefore came to the ED where she was found to have bilateral PEs  Currently she reports mild chest pressure but denies any shortness of breath  She reports mild LLQ abdominal pain as well  ED Vital Signs:   ED Triage Vitals [05/16/18 1453]   Temperature Pulse Respirations Blood Pressure SpO2   99 4 °F (37 4 °C) 102 16 116/66 100 %      Temp Source Heart Rate Source Patient Position - Orthostatic VS BP Location FiO2 (%)   Oral Monitor Sitting Left arm --      Pain Score       3        Wt Readings from Last 1 Encounters:   05/17/18 56 6 kg (124 lb 12 5 oz)       Vital Signs (abnormal): Abnormal Labs/Diagnostic Test Results:   WBC Thousand/uL 11 77*     SODIUM mmol/L 133*   CHLORIDE mmol/L 99*   CT HEAD No acute intracranial abnormality   CT CHEST Large quantity of right and small quantity of left acute pulmonary arterial embolism    ED Treatment:   Medication Administration from 05/16/2018 1400 to 05/16/2018 2003       Date/Time Order Dose Route Action Action by Comments     05/16/2018 1641 sodium chloride 0 9 % bolus 1,000 mL 0 mL Intravenous Stopped Stella Medeiros RN      05/16/2018 1541 sodium chloride 0 9 % bolus 1,000 mL 1,000 mL Intravenous New Bag Stella Medeiros RN      05/16/2018 1545 albuterol inhalation solution 2 5 mg 2 5 mg Nebulization Given Stella Medeiros RN      05/16/2018 1737 iohexol (OMNIPAQUE) 350 MG/ML injection (MULTI-DOSE) 85 mL 85 mL Intravenous Given Rakel Cisse      05/16/2018 1930 enoxaparin (LOVENOX) subcutaneous injection 60 mg 60 mg Subcutaneous Given Pauly Alegria RN           Past Medical/Surgical History:    Active Ambulatory Problems     Diagnosis Date Noted    No Active Ambulatory Problems     Resolved Ambulatory Problems     Diagnosis Date Noted    No Resolved Ambulatory Problems     Past Medical History:   Diagnosis Date    Raynaud disease        Admitting Diagnosis: Dizziness [R42]  Pulmonary emboli (HCC) [I26 99]    Age/Sex: 26 y o  female    Assessment/Plan:   Bilateral pulmonary embolism (HCC)   Assessment & Plan     PT is on birth control however denies any hx of smoking  Denies any family hx of DVT/PE  She was given a dose of Lovenox in the ED  Will admit to medicine, monitor on telemetry, trend troponin, resume Lovenox, order venous dopplers of her lower extremities, TTE and have Hemology/Oncology service evaluate the pt       Dizziness   Assessment & Plan     Can be secondary to her PE   CT head is negative for an acute process  Will order Orthostatics and TTE to check for RV strain       Hyponatremia   Assessment & Plan     Likely SIADH due to above  Will follow BMP tomorrow morning      Abdominal pain   Assessment & Plan     Will order U/A to check for UTI  She has a bruit evident on exam   Can consider ultrasound/doppler for further evaluation       Leukocytosis   Assessment & Plan     May be reactive in nature however will check U/A       SIRS (systemic inflammatory response syndrome) (HCC)   Assessment & Plan     Pt is febrile however this can be due to her PE    Will order UA to check for UTI and continue to monitor vitals       VTE Prophylaxis: Lovenox   Code Status: Level 1 - Full Code  Anticipated Length of Stay:  Patient will be admitted on an Inpatient basis with an anticipated length of stay of atleast 2 midnights       Admission Orders:  TEL EMON  CONSULT HEMATOLOGY  VAS LOWER LIMB VENOUS DUPLEX   VAS IVC/ILIAC DUPLEX  ECHO  ORTHOSTATIC VS   DAILY WEIGTH I/O  Scheduled Meds:   Current Facility-Administered Medications:  acetaminophen 650 mg Oral Q6H PRN Debo Eduardo MD    cefTRIAXone 1,000 mg Intravenous Q24H Lindy Lopez MD Last Rate: 1,000 mg (05/17/18 0224)   enoxaparin 1 mg/kg Subcutaneous Q12H [facility] Carlos Garcia MD    iodixanol 100 mL Intravenous Once in imaging Morgan Storey MD      Continuous Infusions:    PRN Meds:   acetaminophen   iodixanol

## 2018-05-17 NOTE — SOCIAL WORK
Met with pt, her mother and her sister  Pt has no dme  No hhc  Has been independent and driving  Main contact is her father Pepe Carreon whom she resides with  Explained role of cm, no d/c needs  CM reviewed d/c planning process including the following: identifying help at home, patient preference for d/c planning needs, and availability of the treatment team to discuss questions or concerns patient and/or family may have regarding understanding of medications and recognizing signs and symptoms once discharged  CM also encouraged patient to follow up with all recommended appointments after discharge  Patient advised of importance for patient and family to participate in managing patient's medical well being

## 2018-05-17 NOTE — H&P
History and Physical - [facility] Internal Medicine    Patient Information: Artur Zazueta 26 y o  female MRN: [id number]  Unit/Bed#: [address] [place] [address] Encounter: [id number]  Admitting Physician: Lissette Rankin MD  PCP: Maury Romero MD  Date of Admission:  05/16/18    Chief Complaint:     Shortness of breath, chest pain, dizziness   of Present Illness:    Artur Zazueta is a 26 y o  female with a PMH of Raynaud's Disease who presents with chest pain, shortness of breath and dizziness  She states that she was in her usual state of health until 3 weeks ago when she began to have dizziness and lightheadedness  Shew as started on Meclizine and Amoxicillin for suspected Sinusitis  Her dizziness did improve with the Meclizine however did not subside entirely  Yesterday she states that she began to have chest pain and shortness of breath with exertion  Today her symptoms worsened and therefore came to the ED where she was found to have bilateral PEs  Currently she reports mild chest pressure but denies any shortness of breath  She reports mild LLQ abdominal pain as well  No other complaints or concerns     Review of Systems:    Review of Systems   Constitutional: Positive for fever  Respiratory: Positive for shortness of breath  Negative for cough  Cardiovascular: Positive for chest pain  Negative for leg swelling  Gastrointestinal: Positive for abdominal pain  Negative for blood in stool  Genitourinary: Negative for dysuria, hematuria, vaginal bleeding and vaginal discharge  Musculoskeletal: Negative for arthralgias  Neurological: Positive for dizziness  Negative for syncope  Psychiatric/Behavioral: Negative for confusion  Past Medical and Surgical History:     Past Medical History:   Diagnosis Date    Raynaud disease        History reviewed  No pertinent surgical history      Meds/Allergies:    PTA meds:   Prior to Admission Medications   Prescriptions Last Dose Informant Patient Reported? Taking?   meclizine (ANTIVERT) 12 5 MG tablet Past Week at Unknown time  No Yes   Sig: Take 1 tablet (12 5 mg total) by mouth every 8 (eight) hours as needed for dizziness   norethindrone-ethinyl estradiol (GILDESS FE 1/20) 1-20 MG-MCG per tablet 5/14/2018 at Unknown time  Yes Yes   Sig: Take by mouth      Facility-Administered Medications: None       Allergies: No Known Allergies  History:     Marital Status: Single   History   Alcohol Use    Yes     Comment: socially     History   Smoking Status    Never Smoker   Smokeless Tobacco    Never Used     History   Drug Use No       Family History: Mother: healthy   Father: healthy     Physical Exam:     Vitals:   Blood Pressure: 125/55 (05/16/18 2005)  Pulse: (!) 108 (05/16/18 2005)  Temperature: 100 4 °F (38 °C) (05/16/18 2005)  Temp Source: Oral (05/16/18 2005)  Respirations: (!) 24 (05/16/18 2005)  Height: 5' 6" (167 6 cm) (05/16/18 2005)  Weight - Scale: 57 2 kg (126 lb 1 7 oz) (05/16/18 2005)  SpO2: 98 % (05/16/18 2005)    Physical Exam:   General: in no acute distress  HEENT: atraumatic, normocephalic  Skin: no jaundice  CVS: tachycardic, no murmurs appreciated  Lungs: CTAL, no wheezing or rales appreciated  Abdomen: soft, nondistended, bowel sounds normal, nontender upon palpation, no guarding or rebound tenderness, bruit appreciated in right   Extremities: no edema, no calf swelling or tenderness  Neuro: alert and oriented x3  Psych: calm, cooperative      Lab Results: I have personally reviewed pertinent reports          Results from last 7 days  Lab Units 05/16/18  1540   WBC Thousand/uL 11 77*   HEMOGLOBIN g/dL 14 3   HEMATOCRIT % 43 9   PLATELETS Thousands/uL 186   LYMPHO PCT % 17   MONO PCT MAN % 10   EOSINO PCT MANUAL % 0       Results from last 7 days  Lab Units 05/16/18  1540   SODIUM mmol/L 133*   POTASSIUM mmol/L 4 4   CHLORIDE mmol/L 99*   CO2 mmol/L 29   BUN mg/dL 10   CREATININE mg/dL 0 91   CALCIUM mg/dL 8 8   TOTAL PROTEIN g/dL 7 2 BILIRUBIN TOTAL mg/dL 0 50   ALK PHOS U/L 80   ALT U/L 38   AST U/L 18   GLUCOSE RANDOM mg/dL 95           Imaging:     Ct Head Without Contrast    Result Date: 5/16/2018  Narrative: CT BRAIN - WITHOUT CONTRAST INDICATION:   Dizziness  COMPARISON:  None  TECHNIQUE:  CT examination of the brain was performed  In addition to axial images, coronal 2D reformatted images were created and submitted for interpretation  Radiation dose length product (DLP) for this visit:  1143 09 mGy-cm   This examination, like all CT scans performed in the [facility], was performed utilizing techniques to minimize radiation dose exposure, including the use of iterative reconstruction and automated exposure control  IMAGE QUALITY:  Diagnostic  FINDINGS: PARENCHYMA:  No intracranial mass, mass effect or midline shift  No CT signs of acute infarction  No acute intracranial hemorrhage  VENTRICLES AND EXTRA-AXIAL SPACES:  Normal for the patient's age  VISUALIZED ORBITS AND PARANASAL SINUSES:  Unremarkable  CALVARIUM AND EXTRACRANIAL SOFT TISSUES:  Normal      Impression: No acute intracranial abnormality  Workstation performed: [id number]     Cta Ed Chest Pe Study    Result Date: 5/16/2018  Narrative: CTA - CHEST WITH IV CONTRAST - PULMONARY ANGIOGRAM INDICATION:   "a 26-year-old female who presents with a complaint of approximately 2 weeks ago started having episodes of room spinning around dizziness feeling off balance and generalized malaise""Pulmonary/Chest: Breath sounds normal  Tachypnea noted  "  COMPARISON: None  TECHNIQUE: CTA examination of the chest was performed using angiographic technique according to a protocol specifically tailored to evaluate for pulmonary embolism  Axial, sagittal, and coronal 2D reformatted images were created from the source data and  submitted for interpretation  In addition, coronal 3D MIP postprocessing was performed on the acquisition scanner    Radiation dose length product (DLP) for this visit:  464 23 mGy-cm   This examination, like all CT scans performed in the [facility], was performed utilizing techniques to minimize radiation dose exposure, including the use of iterative  reconstruction and automated exposure control  IV Contrast:  85 mL of iohexol (OMNIPAQUE)  350 Multi-dose  FINDINGS: PULMONARY ARTERIAL TREE:  Large quantity of right-sided pulmonary arterial embolism extending from the distal aspect of the right pulmonary artery is dominantly into the right middle lobe and right lower lobe pulmonary arterial vasculature  Small quantity of scattered left-sided pulmonary embolus  LUNGS:  Lungs are clear  There is no tracheal or endobronchial lesion  PLEURA:  Unremarkable  HEART/AORTA:  Unremarkable for patient's age  MEDIASTINUM AND NISA:  Unremarkable  CHEST WALL AND LOWER NECK:   Unremarkable  VISUALIZED STRUCTURES IN THE UPPER ABDOMEN:  Unremarkable  OSSEOUS STRUCTURES:  No acute fracture or destructive osseous lesion  Impression: Large quantity of right and small quantity of left acute pulmonary arterial embolism  I personally discussed this study with Results [employer] on 5/16/2018 at 5:53 PM  Workstation performed: [id number]         Assessment/Plan    * Bilateral pulmonary embolism [facility])   Assessment & Plan    PT is on birth control however denies any hx of smoking  Denies any family hx of DVT/PE  She was given a dose of Lovenox in the ED  Will admit to medicine, monitor on telemetry, trend troponin, resume Lovenox, order venous dopplers of her lower extremities, TTE and have Hemology/Oncology service evaluate the pt         Dizziness   Assessment & Plan    Can be secondary to her PE   CT head is negative for an acute process  Will order Orthostatics and TTE to check for RV strain         Hyponatremia   Assessment & Plan    Likely SIADH due to above    Will follow BMP tomorrow morning        Abdominal pain   Assessment & Plan    Will order U/A to check for UTI  She has a bruit evident on exam   Can consider ultrasound/doppler for further evaluation         Leukocytosis   Assessment & Plan    May be reactive in nature however will check U/A         SIRS (systemic inflammatory response syndrome) ([facility] [address] )   Assessment & Plan    Pt is febrile however this can be due to her PE  Will order UA to check for UTI and continue to monitor vitals             Hospital Problem List:     Principal Problem:    Bilateral pulmonary embolism (HCC)  Active Problems:    Dizziness    Hyponatremia    Abdominal pain    SIRS (systemic inflammatory response syndrome) (HCC)    Leukocytosis        VTE Prophylaxis: Lovenox   Code Status: Level 1 - Full Code    Anticipated Length of Stay:  Patient will be admitted on an Inpatient basis with an anticipated length of stay of atleast 2 midnights  Total Time for Visit, including Counseling / Coordination of Care: 30 minutes  Greater than 50% of this total time spent on direct patient counseling and coordination of care

## 2018-05-17 NOTE — PROGRESS NOTES
Progress Note - Elizabeth Gauthier 1995, 26 y o  female MRN: [id number]    Unit/Bed#: [address] [address] Encounter: [id number]    Primary Care Provider: Kaur Combs MD   Date and time admitted to hospital: 5/16/2018  2:46 PM        * Bilateral pulmonary embolism [facility])   Assessment & Plan    PT is on birth control however denies any hx of smoking  Denies any family hx of DVT/PE  Patient will be continued on Lovenox 1 milligram/kilogram subcu b i d   Given all options the patient's including Coumadin on noval anticoagulants  Patient opting mostly for 1 of the NOACs   Lower extremity venous Doppler showed extensive DVT in the common femoral, iliac veins  Await 2D echo  SIRS (systemic inflammatory response syndrome) (HCC)   Assessment & Plan    Fever and tachycardia likely secondary to PE  Patient is on empiric IV Rocephin for UTI  Follow-up urine cultures          Hyponatremia   Assessment & Plan    Resolved        Leukocytosis   Assessment & Plan    Resolved  Likely secondary to PE doubtful secondary UTI        Dizziness   Assessment & Plan    Can be secondary to her PE   CT head is negative for an acute process  Abdominal pain   Assessment & Plan    Resolved  VTE Pharmacologic Prophylaxis:   Pharmacologic: Enoxaparin (Lovenox)  Mechanical VTE Prophylaxis in Place: No    Patient Centered Rounds: I have performed bedside rounds with nursing staff today  Discussions with Specialists or Other Care Team Provider: No    Education and Discussions with Family / Patient: Yes    Time Spent for Care: 30 minutes  More than 50% of total time spent on counseling and coordination of care as described above      Current Length of Stay: 1 day(s)    Current Patient Status: Inpatient   Certification Statement: The patient will continue to require additional inpatient hospital stay due to Acute pulmonary embolism and DVT    Discharge Plan: Home    Code Status: Level 1 - Full Code      Subjective:   Patient complaining of pleuritic chest pain and shortness of breath  Objective:     Vitals:   Temp (24hrs), Av 3 °F (37 4 °C), Min:98 5 °F (36 9 °C), Max:102 1 °F (38 9 °C)    HR:  [] 88  Resp:  [18-24] 18  BP: ()/(52-65) 112/56  SpO2:  [98 %-100 %] 98 %  Body mass index is 20 14 kg/m²  Input and Output Summary (last 24 hours): Intake/Output Summary (Last 24 hours) at 18 1515  Last data filed at 18 0701   Gross per 24 hour   Intake             1000 ml   Output              940 ml   Net               60 ml       Physical Exam:     Physical Exam   Constitutional: No distress  HENT:   Head: Normocephalic and atraumatic  Nose: Nose normal    Eyes: Conjunctivae and EOM are normal  Pupils are equal, round, and reactive to light  Neck: Normal range of motion  Neck supple  No JVD present  Cardiovascular: Normal rate, regular rhythm and normal heart sounds  Exam reveals no gallop and no friction rub  No murmur heard  Pulmonary/Chest: Effort normal and breath sounds normal  No respiratory distress  She has no wheezes  She has no rales  She exhibits no tenderness  Abdominal: Soft  Bowel sounds are normal  She exhibits no distension  There is no tenderness  There is no rebound and no guarding  Musculoskeletal: She exhibits no edema  Neurological: She is alert  No cranial nerve deficit  Skin: Skin is warm and dry  No rash noted  Psychiatric: She has a normal mood and affect         Additional Data:     Labs:      Results from last 7 days  Lab Units 18  0502   WBC Thousand/uL 9 70   HEMOGLOBIN g/dL 12 8   HEMATOCRIT % 39 8   PLATELETS Thousands/uL 167   NEUTROS PCT % 54   LYMPHS PCT % 31   MONOS PCT % 12   EOS PCT % 3       Results from last 7 days  Lab Units 18  0502 18  1540   SODIUM mmol/L 136 133*   POTASSIUM mmol/L 4 4 4 4   CHLORIDE mmol/L 103 99*   CO2 mmol/L 29 29   BUN mg/dL 6 10   CREATININE mg/dL 0 78 0 91   CALCIUM mg/dL 8 4 8 8   TOTAL PROTEIN g/dL  --  7 2   BILIRUBIN TOTAL mg/dL  --  0 50   ALK PHOS U/L  --  80   ALT U/L  --  38   AST U/L  --  18   GLUCOSE RANDOM mg/dL 108 95           * I Have Reviewed All Lab Data Listed Above  * Additional Pertinent Lab Tests Reviewed: Rohini Hendrickson Admission Reviewed        Recent Cultures (last 7 days):           Last 24 Hours Medication List:     Current Facility-Administered Medications:  acetaminophen 650 mg Oral Q6H PRN El Dahl MD    cefTRIAXone 1,000 mg Intravenous Q24H Tiffany Jung MD Last Rate: 1,000 mg (05/17/18 0224)   enoxaparin 1 mg/kg Subcutaneous Q12H Ciro Cheadle, MD    iodixanol 100 mL Intravenous Once in imaging Ben Downing MD         Today, Patient Was Seen By: El Dahl MD    ** Please Note: Dictation voice to text software may have been used in the creation of this document   **

## 2018-05-17 NOTE — CONSULTS
Hematology Oncology Consult Report    Wilbert Aldana, 1995, [id number]  [address] [address]/[address] [place] [address]-*    Impression and plan:    1  Acute PE and DVT  From a pulmonary standpoint, patient seems stable - this needs to be monitored closely  Patient continue with the Lovenox  Clinically patient seems stable as far as the left lower extremity goes  Patient will need to be transitioned to 1 of the oral agents  We discussed this  From hematology standpoint, warfarin, eliquis, xaralto etc are acceptable  2   Thrombophilia  Eventually, later, when patient is stable and discharged, the thrombophilia evaluation can be performed (but not now right after active thromboses)  Results may have implications for other family members  3   Birth control  Patient will need to discontinue the estrogen birth control; can follow up with her gynecologist in [facility] for other birth control options  4   Dizziness - etiology unclear but may be due to the PE   CT of the brain without contrast did not demonstrate any abnormality  If this persists or progresses, patient should be seen by Neurology  5  Sinusitis  Patient is on antibiotics  Sepsis workup is ongoing  6   Nonspecific abdominal pain  Etiology is not clear  But if this persists or worsens, abdominal imaging needs to take place  Although not comment, thrombotic events sometimes are part of the presenting symptoms in patients with malignancies (I did not discuss this aspect with the patient at this time - too anxious)  The above was discussed with patient and mother; all questions were answered  Will follow with you  Please do not hesitate to contact the Hematology service if you have any questions or concerns  Thank you for this consult     ____________________________________________________________________________________________________________________________      Chief complaint:  PE, DVT    History of present illness:   This is a 29-year-old female who initially started with dizziness and lightheadedness approximately 3 weeks ago  Patient was seen by her PCP and treated for sinusitis  Patient was treated with antibiotics and then steroids  Symptoms progressed to shortness of breath and dyspnea on exertion; patient also had nonspecific abdominal pain  Because of the multiple complaints, patient presented to the emergency room  CTA/chest demonstrated bilateral PE  Patient was admitted  Presently Ms Hylton states feeling +/-  Still with some dizziness and dyspnea on exertion  No headaches, blurred vision or syncopal episodes  No nausea vomiting although appetite is decreased  No problems with excessive bruising or bleeding  No problems with lower extremity pain or other body aches  Past medical history:   Past Medical History:   Diagnosis Date    Raynaud disease      Ob/gyn:  No breast pathology, patient is on BCP (for birth control; no GYN issues)    Past surgical history: History reviewed  No pertinent surgical history      Allergies: No Known Allergies    Home medications:   Prescriptions Prior to Admission   Medication    meclizine (ANTIVERT) 12 5 MG tablet    norethindrone-ethinyl estradiol (GILDESS FE 1/20) 1-20 MG-MCG per tablet     Hospital medications:   Current Facility-Administered Medications:     acetaminophen (TYLENOL) tablet 650 mg, 650 mg, Oral, Q6H PRN, Denise Albarado MD, 650 mg at 05/17/18 1611    cefTRIAXone (ROCEPHIN) IVPB (premix) 1,000 mg, 1,000 mg, Intravenous, Q24H, Susannah Koroma MD, Last Rate: 100 mL/hr at 05/17/18 0224, 1,000 mg at 05/17/18 0224    enoxaparin (LOVENOX) subcutaneous injection 60 mg, 1 mg/kg, Subcutaneous, Q12H [address], Susannah Koroma MD, 60 mg at 05/17/18 0837    iodixanol (VISIPAQUE) 320 MG/ML injection 100 mL, 100 mL, Intravenous, Once in imaging, Nacho Reed MD    Social history: single, works as a , no tobacco, alcohol or drug abuse, no toxic exposure    Family history:   Mother and sister in good general health, maternal grandfather  from blood clots (NOS), maternal uncle with history of DVTs (NOS), patient is of [facility] [place], [facility] and [facility] ancestry    Review of systems: No blurred vision or double vision, no tinnitus or trouble hearing, no headaches, dizziness or body aches, + chest pain, no pressure, no shortness of breath, +  dyspnea on exertion, no cough, sputum or hemoptysis, no nausea, vomiting, diarrhea or constipation, no abdominal pain, no blood in the stools, no weight loss, no urinary incontinence, frequency or dribbling, no hematuria, no yellowing of the skin, no problems with excessive bruising or bleeding from the skin, no numbness, tingling, seizures or syncopal episodes    Physical exam  Vitals:    18 1546   BP: 116/53   Pulse: 95   Resp:    Temp: 98 6 °F (37 °C)   SpO2: 100%   Constitutional:  Well-nourished female, minimal respiratory distress  Eyes:  PERRL, conjunctiva pink, anicteric   HENT:  Atraumatic, external ears normal, nose normal,   Oropharynx: moist, no pharyngeal exudates, no thrush, pink  Neck: No adenopathy, good range of motion  Respiratory: scattered rhonchi,  +splinting  Breasts: deferred, no axillary adenopathy bilaterally  Cardiovascular:  Normal rate, normal rhythm, no murmurs, no gallops, no rubs   GI:  Soft, nontender, +bowel sounds, no rigidity rebound  :  No costovertebral angle tenderness, normal reproductive organs, no discharge  Musculoskeletal:  +tenderness in left anterior thigh leading up to groin  Integument: warm, moist, good color, no petechiae or ecchymoses  Lymphatic:  No lymphadenopathy in the neck, supraclavicular region, infraclavicular region, axilla and groin bilaterally  Extremities:  No obvious lower extremity edema or cords, pulses are 1+ bilaterally  Neurologic:  Alert & oriented x 3, CN 2-12 normal, normal motor function, normal sensory function, no focal deficits noted  Psychiatric:  Speech and behavior appropriate, response time appropriate, + appropriately anxious  Rectal: Deferred    Laboratory test results    Results for Krystyna Read (MRN [id number]) as of 5/17/2018 17:14   Ref  Range 5/17/2018 05:02   WBC Latest Ref Range: 4 31 - 10 16 Thousand/uL 9 70   RBC Latest Ref Range: 3 81 - 5 12 Million/uL 4 18   Hemoglobin Latest Ref Range: 11 5 - 15 4 g/dL 12 8   Hematocrit Latest Ref Range: 34 8 - 46 1 % 39 8   MCV Latest Ref Range: 82 - 98 fL 95   MCH Latest Ref Range: 26 8 - 34 3 pg 30 6   MCHC Latest Ref Range: 31 4 - 37 4 g/dL 32 2   RDW Latest Ref Range: 11 6 - 15 1 % 12 8   Platelets Latest Ref Range: 149 - 390 Thousands/uL 167   MPV Latest Ref Range: 8 9 - 12 7 fL 10 4   nRBC Latest Units: /100 WBCs 0   Neutrophils Relative Latest Ref Range: 43 - 75 % 54   Lymphocytes Relative Latest Ref Range: 14 - 44 % 31   Monocytes Relative Latest Ref Range: 4 - 12 % 12   Eosinophils Relative Latest Ref Range: 0 - 6 % 3   Basophils Relative Latest Ref Range: 0 - 1 % 0     Results for Krystyna Read (MRN [id number]) as of 5/17/2018 17:14   Ref  Range 5/17/2018 05:02   Sodium Latest Ref Range: 136 - 145 mmol/L 136   Potassium Latest Ref Range: 3 5 - 5 3 mmol/L 4 4   Chloride Latest Ref Range: 100 - 108 mmol/L 103   CO2 Latest Ref Range: 21 - 32 mmol/L 29   Anion Gap Latest Ref Range: 4 - 13 mmol/L 4   BUN Latest Ref Range: 5 - 25 mg/dL 6   Creatinine Latest Ref Range: 0 60 - 1 30 mg/dL 0 78   Glucose Latest Ref Range: 65 - 140 mg/dL 108   Calcium Latest Ref Range: 8 3 - 10 1 mg/dL 8 4   eGFR Latest Units: ml/min/1 73sq m 109       Radiology reports    05/17/2018 vascular lower limb duplex study bilateral    RIGHT LOWER LIMB:  No evidence of acute or chronic deep vein thrombosis  No evidence of superficial thrombophlebitis noted  Doppler evaluation shows a normal response to augmentation maneuvers  Popliteal, posterior tibial and anterior tibial arterial Doppler waveforms are  triphasic       LEFT LOWER LIMB:  Acute Occlusive Deep vein thrombosis in the external iliac, common iliac,  common femoral, proximal femoral vein  Acute non-occlusive Superficial thrombosis noted at the origin of the great  saphenous vein  No evidence of acute or chronic deep vein thrombosis in the mid and distal  femoral vein,popliteal vein  and calf veins  No evidence of superficial thrombophlebitis noted in the small saphenous vein  Doppler evaluation shows a normal response to respiration maneuvers  Popliteal, posterior tibial and anterior tibial arterial Doppler waveforms are  triphasic     05/16/2018 CTA chest PE study    PULMONARY ARTERIAL TREE:  Large quantity of right-sided pulmonary arterial embolism extending from the distal aspect of the right pulmonary artery is dominantly into the right middle lobe and right lower lobe pulmonary arterial vasculature  Small   quantity of scattered left-sided pulmonary embolus  IMPRESSION: Large quantity of right and small quantity of left acute pulmonary arterial embolism  05/16/2018 CT head without contrast impression stated no acute intracranial abnormality

## 2018-05-18 VITALS
OXYGEN SATURATION: 100 % | HEIGHT: 66 IN | SYSTOLIC BLOOD PRESSURE: 115 MMHG | WEIGHT: 125.2 LBS | DIASTOLIC BLOOD PRESSURE: 58 MMHG | TEMPERATURE: 98.6 F | RESPIRATION RATE: 10 BRPM | HEART RATE: 97 BPM | BODY MASS INDEX: 20.12 KG/M2

## 2018-05-18 LAB
ANION GAP SERPL CALCULATED.3IONS-SCNC: 7 MMOL/L (ref 4–13)
APTT PPP: 29 SECONDS (ref 24–36)
ATRIAL RATE: 92 BPM
BACTERIA UR CULT: NORMAL
BASOPHILS # BLD AUTO: 0.04 THOUSANDS/ΜL (ref 0–0.1)
BASOPHILS NFR BLD AUTO: 0 % (ref 0–1)
BUN SERPL-MCNC: 4 MG/DL (ref 5–25)
CALCIUM SERPL-MCNC: 8.6 MG/DL (ref 8.3–10.1)
CHLORIDE SERPL-SCNC: 101 MMOL/L (ref 100–108)
CO2 SERPL-SCNC: 28 MMOL/L (ref 21–32)
CREAT SERPL-MCNC: 0.84 MG/DL (ref 0.6–1.3)
EOSINOPHIL # BLD AUTO: 0.28 THOUSAND/ΜL (ref 0–0.61)
EOSINOPHIL NFR BLD AUTO: 3 % (ref 0–6)
ERYTHROCYTE [DISTWIDTH] IN BLOOD BY AUTOMATED COUNT: 12.7 % (ref 11.6–15.1)
GFR SERPL CREATININE-BSD FRML MDRD: 100 ML/MIN/1.73SQ M
GLUCOSE SERPL-MCNC: 107 MG/DL (ref 65–140)
HCT VFR BLD AUTO: 36.6 % (ref 34.8–46.1)
HGB BLD-MCNC: 12.1 G/DL (ref 11.5–15.4)
IMM GRANULOCYTES # BLD AUTO: 0.03 THOUSAND/UL (ref 0–0.2)
IMM GRANULOCYTES NFR BLD AUTO: 0 % (ref 0–2)
INR PPP: 0.97 (ref 0.86–1.16)
LYMPHOCYTES # BLD AUTO: 3.57 THOUSANDS/ΜL (ref 0.6–4.47)
LYMPHOCYTES NFR BLD AUTO: 36 % (ref 14–44)
MCH RBC QN AUTO: 31.3 PG (ref 26.8–34.3)
MCHC RBC AUTO-ENTMCNC: 33.1 G/DL (ref 31.4–37.4)
MCV RBC AUTO: 95 FL (ref 82–98)
MONOCYTES # BLD AUTO: 1.16 THOUSAND/ΜL (ref 0.17–1.22)
MONOCYTES NFR BLD AUTO: 12 % (ref 4–12)
MRSA NOSE QL CULT: NORMAL
NEUTROPHILS # BLD AUTO: 4.86 THOUSANDS/ΜL (ref 1.85–7.62)
NEUTS SEG NFR BLD AUTO: 49 % (ref 43–75)
NRBC BLD AUTO-RTO: 0 /100 WBCS
P AXIS: 68 DEGREES
PLATELET # BLD AUTO: 178 THOUSANDS/UL (ref 149–390)
PMV BLD AUTO: 10.6 FL (ref 8.9–12.7)
POTASSIUM SERPL-SCNC: 4.4 MMOL/L (ref 3.5–5.3)
PR INTERVAL: 140 MS
PROTHROMBIN TIME: 10.2 SECONDS (ref 9.4–11.7)
QRS AXIS: 76 DEGREES
QRSD INTERVAL: 84 MS
QT INTERVAL: 324 MS
QTC INTERVAL: 400 MS
RBC # BLD AUTO: 3.86 MILLION/UL (ref 3.81–5.12)
SODIUM SERPL-SCNC: 136 MMOL/L (ref 136–145)
T WAVE AXIS: 48 DEGREES
VENTRICULAR RATE: 92 BPM
WBC # BLD AUTO: 9.94 THOUSAND/UL (ref 4.31–10.16)

## 2018-05-18 PROCEDURE — 85025 COMPLETE CBC W/AUTO DIFF WBC: CPT | Performed by: INTERNAL MEDICINE

## 2018-05-18 PROCEDURE — 85610 PROTHROMBIN TIME: CPT | Performed by: INTERNAL MEDICINE

## 2018-05-18 PROCEDURE — 80048 BASIC METABOLIC PNL TOTAL CA: CPT | Performed by: INTERNAL MEDICINE

## 2018-05-18 PROCEDURE — 85730 THROMBOPLASTIN TIME PARTIAL: CPT | Performed by: INTERNAL MEDICINE

## 2018-05-18 PROCEDURE — 99233 SBSQ HOSP IP/OBS HIGH 50: CPT | Performed by: INTERNAL MEDICINE

## 2018-05-18 PROCEDURE — 93010 ELECTROCARDIOGRAM REPORT: CPT | Performed by: INTERNAL MEDICINE

## 2018-05-18 PROCEDURE — 99239 HOSP IP/OBS DSCHRG MGMT >30: CPT | Performed by: INTERNAL MEDICINE

## 2018-05-18 RX ORDER — TRAMADOL HYDROCHLORIDE 50 MG/1
50 TABLET ORAL EVERY 8 HOURS PRN
Qty: 10 TABLET | Refills: 0 | Status: SHIPPED | OUTPATIENT
Start: 2018-05-18 | End: 2018-05-20

## 2018-05-18 RX ADMIN — ACETAMINOPHEN 650 MG: 325 TABLET, FILM COATED ORAL at 08:29

## 2018-05-18 RX ADMIN — CEFTRIAXONE 1000 MG: 1 INJECTION, SOLUTION INTRAVENOUS at 00:33

## 2018-05-18 RX ADMIN — ENOXAPARIN SODIUM 60 MG: 60 INJECTION SUBCUTANEOUS at 08:30

## 2018-05-18 NOTE — ASSESSMENT & PLAN NOTE
Patient reported intermittent abdominal pain especially after bowel movements  Advised to follow up with primary care physician and to consider CT of the abdomen and pelvis if patient has recurrent abdominal

## 2018-05-18 NOTE — DISCHARGE SUMMARY
Discharge- Yohan Nixon 1995, 26 y o  female MRN: [id number]    Unit/Bed#: [address] [address] Encounter: [id number]    Primary Care Provider: Sydney Quinn MD   Date and time admitted to hospital: 5/16/2018  2:46 PM        * Bilateral pulmonary embolism [facility])   Assessment & Plan    PT is on birth control however denies any hx of smoking  Denies any family hx of DVT/PE  Patient received Lovenox 1 milligram/kilogram subcutaneous b i d  during the hospital stay  Patient will be discharged on Eliquis 10 milligram p o  BID for 1 week followed by 5 milligram p o  b i d  Lower extremity venous Doppler showed extensive DVT in the common femoral, iliac veins  Echo showed normal ejection fraction without any great ventricular strain  Patient will need outpatient follow-up with Oncology for hypercoagulable workup  Patient advised to stop birth control pills  SIRS (systemic inflammatory response syndrome) (HCC)   Assessment & Plan    Fever and tachycardia likely secondary to PE  Patient is on empiric IV Rocephin for UTI  Urine cultures grew mixed contaminant  Stop antibiotics          Hyponatremia   Assessment & Plan    Resolved        Leukocytosis   Assessment & Plan    Resolved  Likely secondary to PE doubtful secondary UTI        Dizziness   Assessment & Plan    Can be secondary to her PE   CT head is negative for an acute process            Abdominal pain   Assessment & Plan    Patient reported intermittent abdominal pain especially after bowel movements  Advised to follow up with primary care physician and to consider CT of the abdomen and pelvis if patient has recurrent abdominal              Discharging Physician / Practitioner: Roger Echeverria MD  PCP: Sydney Quinn MD  Admission Date:   Admission Orders     Ordered        05/16/18 1828  Inpatient Admission (expected length of stay for this patient is greater than two midnights)  Once             Discharge Date: 05/18/18    Resolved Problems  Date Reviewed: 5/18/2018    None          Consultations During Hospital Stay:  · Dr Viry Milan    Procedures Performed:     · Lower extremity venous Doppler showed acute occlusive DVT in the left  external and common iliac veins  vein  · Echo showed normal ejection fraction without any right ventricular strain    ·      Outpatient Tests Requested:  · Follow-up with Dr Viry Milan in 2 weeks    Complications:  None    Reason for Admission:  Dizziness, exertional shortness of breath    Hospital Course:     June Delvalle is a 26 y o  female patient with past medical for not disease who originally presented to the hospital on 5/16/2018 due to chest pains, shortness of breath and dizziness  In the ED patient had CT of the chest which showed bilateral P E  Patient was started on Lovenox 1 milligram/kilogram subcutaneously b i d     Later patient was seen in consultation with Hematology  Patient also had lower extremity venous Dopplers which showed acute occlusive DVT in the left external common iliac veins  Patient had echo which showed normal ejection fraction without any RV strain  The patient will be discharged home on Eliquis 10 milligram p o  b i d  followed by 5 milligram p o  b i d   Patient also complained of intermittent abdominal pain during the hospital stay especially after bowel movements and patient advised to get a CT of the abdomen pelvis as outpatient if patient has recurrent abdominal pain  Patient did spike a temperature initially with mild elevated white count and patient was started on IV Rocephin for possible UTI  Later patient's urine cultures grew mixed contaminants and antibiotics were stopped  Please see above list of diagnoses and related plan for additional information  Condition at Discharge: stable     Discharge Day Visit / Exam:     Subjective:  Patient complaining of pain in the left thigh    Patient also reports intermittent abdominal pain especially with  bowel movement  Vitals: Blood Pressure: 115/58 (05/18/18 1520)  Pulse: 97 (05/18/18 1520)  Temperature: 98 6 °F (37 °C) (05/18/18 1520)  Temp Source: Temporal (05/18/18 1520)  Respirations: (!) 10 (05/18/18 1520)  Height: 5' 6" (167 6 cm) (05/16/18 2005)  Weight - Scale: 56 8 kg (125 lb 3 2 oz) (05/18/18 0600)  SpO2: 100 % (05/18/18 1520)  Exam:   Physical Exam   Constitutional: No distress  HENT:   Head: Normocephalic and atraumatic  Nose: Nose normal    Eyes: Conjunctivae and EOM are normal  Pupils are equal, round, and reactive to light  Neck: Normal range of motion  Neck supple  No JVD present  Cardiovascular: Normal rate, regular rhythm and normal heart sounds  Exam reveals no gallop and no friction rub  No murmur heard  Pulmonary/Chest: Effort normal and breath sounds normal  No respiratory distress  She has no wheezes  She has no rales  She exhibits no tenderness  Abdominal: Soft  Bowel sounds are normal  She exhibits no distension  There is no tenderness  There is no rebound and no guarding  Musculoskeletal: She exhibits no edema  Neurological: She is alert  No cranial nerve deficit  Skin: Skin is warm and dry  No rash noted  Psychiatric: She has a normal mood and affect  Discussion with Family: yes    Discharge instructions/Information to patient and family:   See after visit summary for information provided to patient and family  Provisions for Follow-Up Care:  See after visit summary for information related to follow-up care and any pertinent home health orders  Disposition:     Home    For Discharges to   [address] [facility]:   · Not Applicable to this Patient - Not Applicable to this Patient    Planned Readmission: No     Discharge Statement:  I spent 35 minutes discharging the patient  This time was spent on the day of discharge  I had direct contact with the patient on the day of discharge   Greater than 50% of the total time was spent examining patient, answering all patient questions, arranging and discussing plan of care with patient as well as directly providing post-discharge instructions  Additional time then spent on discharge activities  Discharge Medications:  See after visit summary for reconciled discharge medications provided to patient and family        ** Please Note: This note has been constructed using a voice recognition system **

## 2018-05-18 NOTE — PROGRESS NOTES
Hematology - Oncology Progress Note    [facility], 1995, [id number]  [address] [address]/[address] [address]-*      Impression and plan:    1  Acute PE and DVT  From a pulmonary standpoint, patient seems stable  Patient has been started on eliquis  At length, we discussed how to take the medication and what to monitor for as far as excessive bruising and bleeding  Patient will also monitor for any progressive respiratory issues or worsening left lower extremity swelling  We also talked about excessive bruising and bleeding  From hematology standpoint, patient can be discharged as long as her respiratory status is stable and the anticoagulation questions have been clarified  In the future, patient will be recalled back to the office so that a complete thrombophilia evaluation can be performed  Ms Deshawn Greco will need to follow up with her gynecologist regarding other forms of birth control  2   Dizziness - etiology unclear but may be due to the PE   CT of the brain without contrast did not demonstrate any abnormality  This seems to be less/better than before  Patient is monitoring  Patient understands that she should take it easy at least for the next few days      3  Sinusitis  Patient is on antibiotics  Clinically patient does not look septic      4   Nonspecific abdominal pain  Etiology is not clear; the abdominal pain seems to be lower abdominal/left pelvic pain  There are no obvious palpable masses  Patient states that the pain is worse after moving her bowels  Patient will monitor this closely -GI possibly GYN evaluation will be necessary if this persists  5  Left lower extremity pain  I discussed this with the hospitalist -patient is to be discharged, she should have medication for pain control (Tylenol has not been very effective; Ultram or small number of Percocet tablets etc)      ______________________________________________________________________________________________________    Chief complaint:  DVT, PE    History of present illness: 26-year-old female who initially started with dizziness and lightheadedness approximately 3 weeks ago  Patient was seen by her PCP and treated for sinusitis  Patient was treated with antibiotics and then steroids  Symptoms progressed to shortness of breath and dyspnea on exertion; patient also had nonspecific abdominal pain  Because of the multiple complaints, patient presented to the emergency room  CTA/chest demonstrated bilateral PE  Patient was admitted      Presently Ms Franco Sick states feeling slightly better than before  Breathing is slightly better than before, patient can take a slightly deeper breath  Patient has throbbing pain in the left lower extremity; Tylenol has not been very effective  No problems with excessive bruising or bleeding  Hospital medications list:    Current Facility-Administered Medications:  acetaminophen 650 mg Oral Q6H PRN Brennon Morris MD    cefTRIAXone 1,000 mg Intravenous Q24H Gabby Foster MD Last Rate: 1,000 mg (05/18/18 0033)   enoxaparin 1 mg/kg Subcutaneous Q12H [facility] Carlos Cadet MD    iodixanol 100 mL Intravenous Once in imaging Bahman Chinchilla MD      Physical exam  /58 (BP Location: Left arm)   Pulse (!) 107   Temp 97 6 °F (36 4 °C) (Temporal)   Resp 16   Ht 5' 6" (1 676 m)   Wt 56 8 kg (125 lb 3 2 oz)   LMP 05/02/2018 (Approximate)   SpO2 97%   Breastfeeding?  No   BMI 20 21 kg/m²   Constitutional: Well formed, well-nourished  in no apparent distress  Eyes:  PERRL, conjunctiva pink, anicteric   HENT:  Atraumatic, external ears normal, nose normal,   Oropharynx: moist, no pharyngeal exudates, no thrush, pink  Neck: No adenopathy, good range of motion  Respiratory: scattered rhonchi  Breasts: deferred, no axillary adenopathy bilaterally  Cardiovascular:  Normal rate, normal rhythm, no murmurs, no gallops, no rubs   GI:  Soft, nontender, +bowel sounds, no rigidity rebound  :  No costovertebral angle tenderness, normal reproductive organs, no discharge  Musculoskeletal:  +tenderness in left anterior thigh leading up to groin - less/better than before  Integument: warm, moist, good color, no petechiae or ecchymoses  Lymphatic:  No lymphadenopathy in the neck, supraclavicular region, infraclavicular region, axilla and groin bilaterally  Extremities:  No obvious lower extremity edema or cords, pulses are 1+ bilaterally  Neurologic:  Alert & oriented x 3, CN 2-12 normal, normal motor function, normal sensory function, no focal deficits noted  Psychiatric:  Speech and behavior appropriate, response time appropriate, + appropriately anxious  Rectal: Deferred    Laboratory test results    Results for Krystyna Read (MRN [id number]) as of 5/18/2018 07:30   Ref  Range 5/18/2018 05:14   WBC Latest Ref Range: 4 31 - 10 16 Thousand/uL 9 94   RBC Latest Ref Range: 3 81 - 5 12 Million/uL 3 86   Hemoglobin Latest Ref Range: 11 5 - 15 4 g/dL 12 1   Hematocrit Latest Ref Range: 34 8 - 46 1 % 36 6   MCV Latest Ref Range: 82 - 98 fL 95   MCH Latest Ref Range: 26 8 - 34 3 pg 31 3   MCHC Latest Ref Range: 31 4 - 37 4 g/dL 33 1   RDW Latest Ref Range: 11 6 - 15 1 % 12 7   Platelets Latest Ref Range: 149 - 390 Thousands/uL 178   MPV Latest Ref Range: 8 9 - 12 7 fL 10 6   nRBC Latest Units: /100 WBCs 0   Neutrophils Relative Latest Ref Range: 43 - 75 % 49   Lymphocytes Relative Latest Ref Range: 14 - 44 % 36   Monocytes Relative Latest Ref Range: 4 - 12 % 12   Eosinophils Relative Latest Ref Range: 0 - 6 % 3   Basophils Relative Latest Ref Range: 0 - 1 % 0        Radiology reports     05/17/2018 vascular lower limb duplex study bilateral     RIGHT LOWER LIMB:  No evidence of acute or chronic deep vein thrombosis  No evidence of superficial thrombophlebitis noted  Doppler evaluation shows a normal response to augmentation maneuvers    Popliteal, posterior tibial and anterior tibial arterial Doppler waveforms are  triphasic      LEFT LOWER LIMB:  Acute Occlusive Deep vein thrombosis in the external iliac, common iliac,  common femoral, proximal femoral vein  Acute non-occlusive Superficial thrombosis noted at the origin of the great  saphenous vein  No evidence of acute or chronic deep vein thrombosis in the mid and distal  femoral vein,popliteal vein  and calf veins  No evidence of superficial thrombophlebitis noted in the small saphenous vein  Doppler evaluation shows a normal response to respiration maneuvers  Popliteal, posterior tibial and anterior tibial arterial Doppler waveforms are  triphasic      05/16/2018 CTA chest PE study     PULMONARY ARTERIAL TREE:  Large quantity of right-sided pulmonary arterial embolism extending from the distal aspect of the right pulmonary artery is dominantly into the right middle lobe and right lower lobe pulmonary arterial vasculature   Small   quantity of scattered left-sided pulmonary embolus      IMPRESSION: Large quantity of right and small quantity of left acute pulmonary arterial embolism      05/16/2018 CT head without contrast impression stated no acute intracranial abnormality

## 2018-05-18 NOTE — PLAN OF CARE
Problem: PAIN - ADULT  Goal: Verbalizes/displays adequate comfort level or baseline comfort level  Interventions:  - Encourage patient to monitor pain and request assistance  - Assess pain using appropriate pain scale  - Administer analgesics based on type and severity of pain and evaluate response  - Implement non-pharmacological measures as appropriate and evaluate response  - Consider cultural and social influences on pain and pain management  - Notify physician/advanced practitioner if interventions unsuccessful or patient reports new pain   Outcome: Completed Date Met: 05/18/18      Problem: INFECTION - ADULT  Goal: Absence or prevention of progression during hospitalization  INTERVENTIONS:  - Assess and monitor for signs and symptoms of infection  - Monitor lab/diagnostic results  - Monitor all insertion sites, i e  indwelling lines, tubes, and drains  - [place] appropriate cooling/warming therapies per order  - Administer medications as ordered  - Instruct and encourage patient and family to use good hand hygiene technique  - Identify and instruct in appropriate isolation precautions for identified infection/condition   Outcome: Completed Date Met: 05/18/18      Problem: SAFETY ADULT  Goal: Patient will remain free of falls  INTERVENTIONS:  - Assess patient frequently for physical needs  -  Identify cognitive and physical deficits and behaviors that affect risk of falls    -  [place] fall precautions as indicated by assessment   - Educate patient/family on patient safety including physical limitations  - Instruct patient to call for assistance with activity based on assessment  - Modify environment to reduce risk of injury  - Consider OT/PT consult to assist with strengthening/mobility   Outcome: Completed Date Met: 05/18/18    Goal: Maintain or return to baseline ADL function  INTERVENTIONS:  -  Assess patient's ability to carry out ADLs; assess patient's baseline for ADL function and identify physical deficits which impact ability to perform ADLs (bathing, care of mouth/teeth, toileting, grooming, dressing, etc )  - Assess/evaluate cause of self-care deficits   - Assess range of motion  - Assess patient's mobility; develop plan if impaired  - Assess patient's need for assistive devices and provide as appropriate  - Encourage maximum independence but intervene and supervise when necessary  ¯ Involve family in performance of ADLs  ¯ Assess for home care needs following discharge   ¯ Request OT consult to assist with ADL evaluation and planning for discharge  ¯ Provide patient education as appropriate   Outcome: Completed Date Met: 05/18/18    Goal: Maintain or return mobility status to optimal level  INTERVENTIONS:  - Assess patient's baseline mobility status (ambulation, transfers, stairs, etc )    - Identify cognitive and physical deficits and behaviors that affect mobility  - Identify mobility aids required to assist with transfers and/or ambulation (gait belt, sit-to-stand, lift, walker, cane, etc )  - [place] fall precautions as indicated by assessment  - Record patient progress and toleration of activity level on Mobility SBAR; progress patient to next Phase/Stage  - Instruct patient to call for assistance with activity based on assessment  - Request Rehabilitation consult to assist with strengthening/weightbearing, etc    Outcome: Completed Date Met: 05/18/18      Problem: DISCHARGE PLANNING  Goal: Discharge to home or other facility with appropriate resources  INTERVENTIONS:  - Identify barriers to discharge w/patient and caregiver  - Arrange for needed discharge resources and transportation as appropriate  - Identify discharge learning needs (meds, wound care, etc )  - Arrange for interpretive services to assist at discharge as needed  - Refer to Case Management Department for coordinating discharge planning if the patient needs post-hospital services based on physician/advanced practitioner order or complex needs related to functional status, cognitive ability, or social support system   Outcome: Completed Date Met: 05/18/18      Problem: Knowledge Deficit  Goal: Patient/family/caregiver demonstrates understanding of disease process, treatment plan, medications, and discharge instructions  Complete learning assessment and assess knowledge base    Interventions:  - Provide teaching at level of understanding  - Provide teaching via preferred learning methods   Outcome: Completed Date Met: 05/18/18      Problem: DISCHARGE PLANNING - CARE MANAGEMENT  Goal: Discharge to post-acute care or home with appropriate resources  INTERVENTIONS:  - Conduct assessment to determine patient/family and health care team treatment goals, and need for post-acute services based on payer coverage, community resources, and patient preferences, and barriers to discharge  - Address psychosocial, clinical, and financial barriers to discharge as identified in assessment in conjunction with the patient/family and health care team  - Arrange appropriate level of post-acute services according to patient's   needs and preference and payer coverage in collaboration with the physician and health care team  - Communicate with and update the patient/family, physician, and health care team regarding progress on the discharge plan  - Arrange appropriate transportation to post-acute venues   Outcome: Completed Date Met: 05/18/18

## 2018-05-18 NOTE — PLAN OF CARE
Problem: PAIN - ADULT  Goal: Verbalizes/displays adequate comfort level or baseline comfort level  Interventions:  - Encourage patient to monitor pain and request assistance  - Assess pain using appropriate pain scale  - Administer analgesics based on type and severity of pain and evaluate response  - Implement non-pharmacological measures as appropriate and evaluate response  - Consider cultural and social influences on pain and pain management  - Notify physician/advanced practitioner if interventions unsuccessful or patient reports new pain   Outcome: Progressing      Problem: INFECTION - ADULT  Goal: Absence or prevention of progression during hospitalization  INTERVENTIONS:  - Assess and monitor for signs and symptoms of infection  - Monitor lab/diagnostic results  - Monitor all insertion sites, i e  indwelling lines, tubes, and drains  - [place] appropriate cooling/warming therapies per order  - Administer medications as ordered  - Instruct and encourage patient and family to use good hand hygiene technique  - Identify and instruct in appropriate isolation precautions for identified infection/condition   Outcome: Progressing      Problem: SAFETY ADULT  Goal: Patient will remain free of falls  INTERVENTIONS:  - Assess patient frequently for physical needs  -  Identify cognitive and physical deficits and behaviors that affect risk of falls    -  [place] fall precautions as indicated by assessment   - Educate patient/family on patient safety including physical limitations  - Instruct patient to call for assistance with activity based on assessment  - Modify environment to reduce risk of injury  - Consider OT/PT consult to assist with strengthening/mobility   Outcome: Progressing    Goal: Maintain or return to baseline ADL function  INTERVENTIONS:  -  Assess patient's ability to carry out ADLs; assess patient's baseline for ADL function and identify physical deficits which impact ability to perform ADLs (bathing, care of mouth/teeth, toileting, grooming, dressing, etc )  - Assess/evaluate cause of self-care deficits   - Assess range of motion  - Assess patient's mobility; develop plan if impaired  - Assess patient's need for assistive devices and provide as appropriate  - Encourage maximum independence but intervene and supervise when necessary  ¯ Involve family in performance of ADLs  ¯ Assess for home care needs following discharge   ¯ Request OT consult to assist with ADL evaluation and planning for discharge  ¯ Provide patient education as appropriate   Outcome: Progressing    Goal: Maintain or return mobility status to optimal level  INTERVENTIONS:  - Assess patient's baseline mobility status (ambulation, transfers, stairs, etc )    - Identify cognitive and physical deficits and behaviors that affect mobility  - Identify mobility aids required to assist with transfers and/or ambulation (gait belt, sit-to-stand, lift, walker, cane, etc )  - [place] fall precautions as indicated by assessment  - Record patient progress and toleration of activity level on Mobility SBAR; progress patient to next Phase/Stage  - Instruct patient to call for assistance with activity based on assessment  - Request Rehabilitation consult to assist with strengthening/weightbearing, etc    Outcome: Progressing      Problem: DISCHARGE PLANNING  Goal: Discharge to home or other facility with appropriate resources  INTERVENTIONS:  - Identify barriers to discharge w/patient and caregiver  - Arrange for needed discharge resources and transportation as appropriate  - Identify discharge learning needs (meds, wound care, etc )  - Arrange for interpretive services to assist at discharge as needed  - Refer to Case Management Department for coordinating discharge planning if the patient needs post-hospital services based on physician/advanced practitioner order or complex needs related to functional status, cognitive ability, or social support system   Outcome: Progressing      Problem: Knowledge Deficit  Goal: Patient/family/caregiver demonstrates understanding of disease process, treatment plan, medications, and discharge instructions  Complete learning assessment and assess knowledge base    Interventions:  - Provide teaching at level of understanding  - Provide teaching via preferred learning methods   Outcome: Progressing      Problem: DISCHARGE PLANNING - CARE MANAGEMENT  Goal: Discharge to post-acute care or home with appropriate resources  INTERVENTIONS:  - Conduct assessment to determine patient/family and health care team treatment goals, and need for post-acute services based on payer coverage, community resources, and patient preferences, and barriers to discharge  - Address psychosocial, clinical, and financial barriers to discharge as identified in assessment in conjunction with the patient/family and health care team  - Arrange appropriate level of post-acute services according to patient's   needs and preference and payer coverage in collaboration with the physician and health care team  - Communicate with and update the patient/family, physician, and health care team regarding progress on the discharge plan  - Arrange appropriate transportation to post-acute venues   Outcome: Progressing

## 2018-05-18 NOTE — ASSESSMENT & PLAN NOTE
PT is on birth control however denies any hx of smoking  Denies any family hx of DVT/PE  Patient received Lovenox 1 milligram/kilogram subcutaneous b i d  during the hospital stay  Patient will be discharged on Eliquis 10 milligram p o  BID for 1 week followed by 5 milligram p o  b i d  Lower extremity venous Doppler showed extensive DVT in the common femoral, iliac veins  Echo showed normal ejection fraction without any great ventricular strain  Patient will need outpatient follow-up with Oncology for hypercoagulable workup  Patient advised to stop birth control pills

## 2018-05-18 NOTE — ASSESSMENT & PLAN NOTE
Fever and tachycardia likely secondary to PE  Patient is on empiric IV Rocephin for UTI  Urine cultures grew mixed contaminant  Stop antibiotics

## 2018-05-18 NOTE — PLAN OF CARE
DISCHARGE PLANNING     Discharge to home or other facility with appropriate resources Progressing        DISCHARGE PLANNING - CARE MANAGEMENT     Discharge to post-acute care or home with appropriate resources Progressing        INFECTION - ADULT     Absence or prevention of progression during hospitalization Progressing        Knowledge Deficit     Patient/family/caregiver demonstrates understanding of disease process, treatment plan, medications, and discharge instructions Progressing        PAIN - ADULT     Verbalizes/displays adequate comfort level or baseline comfort level Progressing        SAFETY ADULT     Patient will remain free of falls Progressing     Maintain or return to baseline ADL function Progressing     Maintain or return mobility status to optimal level Progressing

## 2018-05-20 ENCOUNTER — APPOINTMENT (EMERGENCY)
Dept: RADIOLOGY | Facility: HOSPITAL | Age: 23
End: 2018-05-20
Payer: COMMERCIAL

## 2018-05-20 ENCOUNTER — HOSPITAL ENCOUNTER (EMERGENCY)
Facility: HOSPITAL | Age: 23
Discharge: HOME | End: 2018-05-20
Attending: EMERGENCY MEDICINE
Payer: COMMERCIAL

## 2018-05-20 VITALS
OXYGEN SATURATION: 98 % | DIASTOLIC BLOOD PRESSURE: 74 MMHG | RESPIRATION RATE: 18 BRPM | BODY MASS INDEX: 20.09 KG/M2 | HEART RATE: 87 BPM | SYSTOLIC BLOOD PRESSURE: 158 MMHG | HEIGHT: 66 IN | TEMPERATURE: 98.2 F | WEIGHT: 125 LBS

## 2018-05-20 DIAGNOSIS — I26.99 PULMONARY INFARCT (HCC): Primary | ICD-10-CM

## 2018-05-20 PROCEDURE — 96375 TX/PRO/DX INJ NEW DRUG ADDON: CPT

## 2018-05-20 PROCEDURE — 71275 CT ANGIOGRAPHY CHEST: CPT

## 2018-05-20 PROCEDURE — 96374 THER/PROPH/DIAG INJ IV PUSH: CPT

## 2018-05-20 PROCEDURE — 99284 EMERGENCY DEPT VISIT MOD MDM: CPT

## 2018-05-20 RX ORDER — OXYCODONE HYDROCHLORIDE AND ACETAMINOPHEN 5; 325 MG/1; MG/1
1 TABLET ORAL EVERY 4 HOURS PRN
Qty: 20 TABLET | Refills: 0 | Status: SHIPPED | OUTPATIENT
Start: 2018-05-20 | End: 2018-05-25

## 2018-05-20 RX ORDER — IBUPROFEN 600 MG/1
TABLET ORAL EVERY 6 HOURS PRN
COMMUNITY
End: 2018-06-25

## 2018-05-20 RX ORDER — KETOROLAC TROMETHAMINE 30 MG/ML
30 INJECTION, SOLUTION INTRAMUSCULAR; INTRAVENOUS ONCE
Status: COMPLETED | OUTPATIENT
Start: 2018-05-20 | End: 2018-05-20

## 2018-05-20 RX ORDER — TRAMADOL HYDROCHLORIDE 50 MG/1
50 TABLET ORAL EVERY 8 HOURS PRN
Qty: 10 TABLET | Refills: 0 | Status: SHIPPED | OUTPATIENT
Start: 2018-05-20 | End: 2018-05-23

## 2018-05-20 RX ORDER — MORPHINE SULFATE 4 MG/ML
4 INJECTION, SOLUTION INTRAMUSCULAR; INTRAVENOUS ONCE
Status: COMPLETED | OUTPATIENT
Start: 2018-05-20 | End: 2018-05-20

## 2018-05-20 RX ADMIN — KETOROLAC TROMETHAMINE 30 MG: 30 INJECTION, SOLUTION INTRAMUSCULAR at 11:51

## 2018-05-20 RX ADMIN — MORPHINE SULFATE 4 MG: 4 INJECTION INTRAVENOUS at 13:05

## 2018-05-20 RX ADMIN — IOHEXOL 85 ML: 350 INJECTION, SOLUTION INTRAVENOUS at 12:40

## 2018-05-20 NOTE — ED PROVIDER NOTES
History  Chief Complaint   Patient presents with    Pain With Breathing     patient was here Wednesday, diagnosed with PE and DVT  Discharged Friday on eliquis  Took Motrin around 10am      Patient is a 16-year-old female that was recently diagnosed with pulmonary embolism and discharged from the hospital on Eliquis  Patient states she is having a new pain in the right lateral ribs  This pain is sharp and stabbing nonradiating is keeping her from taking a deep breath  The pain is much worse with deep inspiration  Patient denies any fevers or chills, no cough or cold symptoms  Patient states she is having some pain in her left thigh but this was recently found to have DVT during her last hospital workup  Patient's white been taking Motrin home to try to control pain without success  Prior to Admission Medications   Prescriptions Last Dose Informant Patient Reported? Taking? apixaban (ELIQUIS) 5 mg   No Yes   Sig: Take 1 tablet (5 mg total) by mouth 2 (two) times a day   ibuprofen (MOTRIN) 600 mg tablet 5/20/2018 at 1000  Yes Yes   Sig: Take by mouth every 6 (six) hours as needed for mild pain   traMADol (ULTRAM) 50 mg tablet   No Yes   Sig: Take 1 tablet (50 mg total) by mouth every 8 (eight) hours as needed for moderate pain for up to 3 days      Facility-Administered Medications: None       Past Medical History:   Diagnosis Date    DVT (deep venous thrombosis) (HCC)     Pulmonary embolism (HCC)     Raynaud disease        History reviewed  No pertinent surgical history  History reviewed  No pertinent family history  I have reviewed and agree with the history as documented  Social History   Substance Use Topics    Smoking status: Never Smoker    Smokeless tobacco: Never Used    Alcohol use Yes      Comment: socially        Review of Systems   Constitutional: Negative for chills and fever  HENT: Negative for facial swelling and trouble swallowing      Respiratory: Positive for chest tightness and shortness of breath  Negative for wheezing and stridor  Cardiovascular: Positive for chest pain  Negative for palpitations and leg swelling  Gastrointestinal: Negative  Genitourinary: Negative for dysuria and flank pain  Musculoskeletal: Negative for back pain and neck pain  Skin: Negative  Neurological: Negative for weakness and numbness  Hematological: Negative  Psychiatric/Behavioral: Negative  Physical Exam  Physical Exam   Constitutional: She is oriented to person, place, and time  She appears well-developed and well-nourished  She appears distressed  HENT:   Head: Normocephalic and atraumatic  Eyes: EOM are normal  Pupils are equal, round, and reactive to light  Neck: Normal range of motion  Neck supple  Cardiovascular: Normal rate  Pulmonary/Chest: Accessory muscle usage present  Tachypnea noted  She has decreased breath sounds in the right lower field and the left lower field  She has no wheezes  She has no rhonchi  She has no rales  Musculoskeletal: Normal range of motion  Neurological: She is alert and oriented to person, place, and time  Skin: Skin is warm and dry  Psychiatric: She has a normal mood and affect  Nursing note and vitals reviewed        Vital Signs  ED Triage Vitals [05/20/18 1135]   Temperature Pulse Respirations Blood Pressure SpO2   98 3 °F (36 8 °C) (!) 108 (!) 26 144/71 100 %      Temp Source Heart Rate Source Patient Position - Orthostatic VS BP Location FiO2 (%)   Oral Monitor Sitting Right arm --      Pain Score       7           Vitals:    05/20/18 1135 05/20/18 1241 05/20/18 1400 05/20/18 1458   BP: 144/71 163/79 152/75 158/74   Pulse: (!) 108 79 72 87   Patient Position - Orthostatic VS: Sitting Lying         Visual Acuity      ED Medications  Medications   ketorolac (TORADOL) injection 30 mg (30 mg Intravenous Given 5/20/18 1151)   iohexol (OMNIPAQUE) 350 MG/ML injection (MULTI-DOSE) 85 mL (85 mL Intravenous Given 5/20/18 1240)   morphine (PF) 4 mg/mL injection 4 mg (4 mg Intravenous Given 5/20/18 1305)       Diagnostic Studies  Results Reviewed     None                 CTA ED chest PE study   Final Result by Curt Mallory MD (05/20 1301)      Unchanged large burden right lower lobe and right middle lobe pulmonary arterial pulmonary embolism  Improved left lower lobe small burden pulmonary embolus  No evidence of right heart strain  New right basilar opacity likely related to pulmonary infarct from pulmonary embolism  Pulmonary embolism findings were already discussed with Marion Remy on 5/16/2018 at 5:53 PM             Workstation performed: [id number]                    Procedures  Procedures       Phone Contacts  ED Phone Contact    ED Course                               MDM  Number of Diagnoses or Management Options  [facility]):   Diagnosis management comments: Patient's CT scan showed no increase in clot burden in the patient's lungs  There is may be a new finding of a possible pulmonary infarct on that side that may be causing her increased pain  Patient was given pain medications in the ER she is breathing easier and deeper  I discussed the need for pain management so that she may be able to take deep breaths without getting any atelectasis or pneumonia is on top  Patient was given incentive spirometer and taught how to use it and a prescription for pain medication  Patient father state understanding and agreement the assessment plan  Amount and/or Complexity of Data Reviewed  Clinical lab tests: ordered and reviewed  Tests in the radiology section of CPT®: ordered and reviewed      The patient presented with a condition in which there was a high probability of imminent or life-threatening deterioration, and critical care services (excluding separately billable procedures) totalled 30-74 minutes          Disposition  Final diagnoses:   Pulmonary infarct [facility])     Time reflects when diagnosis was documented in both MDM as applicable and the Disposition within this note     Time User Action Codes Description Comment    5/20/2018  2:34 PM Feliciano Pena Add [I26 99] Pulmonary infarct [facility])       ED Disposition     ED Disposition Condition Comment    Discharge  Nakia Torres discharge to home/self care  Condition at discharge: Stable        Follow-up Information     Follow up With Specialties Details Why Contact Info    Allan Wallace MD Family Medicine Schedule an appointment as soon as possible for a visit in 1 week  [id number] [facility] [address]  [phone]            Discharge Medication List as of 5/20/2018  2:36 PM      START taking these medications    Details   oxyCODONE-acetaminophen (PERCOCET) 5-325 mg per tablet Take 1 tablet by mouth every 4 (four) hours as needed for moderate pain for up to 5 days Max Daily Amount: 6 tablets, Starting Sun 5/20/2018, Until Fri 5/25/2018, Print         CONTINUE these medications which have NOT CHANGED    Details   apixaban (ELIQUIS) 5 mg Take 1 tablet (5 mg total) by mouth 2 (two) times a day, Starting Fri 5/18/2018, Normal      ibuprofen (MOTRIN) 600 mg tablet Take by mouth every 6 (six) hours as needed for mild pain, Historical Med      traMADol (ULTRAM) 50 mg tablet Take 1 tablet (50 mg total) by mouth every 8 (eight) hours as needed for moderate pain for up to 3 days, Starting Fri 5/18/2018, Until Mon 5/21/2018, Normal           No discharge procedures on file      ED Provider  Electronically Signed by           Doris Harry MD  05/20/18 0764

## 2018-05-20 NOTE — DISCHARGE INSTRUCTIONS
Pulmonary Embolism   WHAT YOU NEED TO KNOW:   A pulmonary embolism (PE) is the sudden blockage of a blood vessel in the lungs by an embolus  An embolus is a small piece of blood clot, fat, air, or tumor cells  The embolus cuts off the blood supply to your lungs  A pulmonary embolism can become life-threatening  DISCHARGE INSTRUCTIONS:   Call 911 for any of the following:   · You feel lightheaded, short of breath, and have chest pain  · You cough up blood  · You have a seizure  · You have slurred speech, increased sleepiness, or problems seeing, talking, or thinking  · You have weakness or cannot move your arm or leg on one side of your body  Seek care immediately if:   · You feel faint  · You have a severe headache  · Your heart is beating faster than normal   Contact your healthcare provider if:   · The skin on any part of your legs or hips turns purple  · Your gums or nose bleed  · You see blood in your urine or bowel movements  · Your bowel movements are black or darker than normal     · You have questions or concerns about your condition or care  Medicines:   · Blood thinners  help treat the PE and prevent new clots from forming  Examples of blood thinners include heparin, rivaroxaban, apixiban, and warfarin  The following are general safety guidelines to follow while you are taking a blood thinner:     ¨ Watch for bleeding and bruising  Watch for bleeding from your gums or nose  Watch for blood in your urine and bowel movements  Use a soft washcloth on your skin, and a soft toothbrush to brush your teeth  This can keep your skin and gums from bleeding  If you shave, use an electric shaver  Do not play contact sports  ¨ Tell your dentist and other healthcare providers that you take a blood thinner  Wear a bracelet or necklace that says you take this medicine  ¨ Do not start or stop any medicines unless your healthcare provider tells you to   Many medicines cannot be used with blood thinners  ¨ Tell your healthcare provider right away if you forget to take the blood thinner , or if you take too much  ¨ Warfarin  is a blood thinner that you may need to take  The following are additional things you should be aware of if you take warfarin:    § Foods and medicines can affect the amount of warfarin in your blood  Do not make major changes to your diet  Warfarin works best when you eat about the same amount of vitamin K every day  Vitamin K is found in green leafy vegetables and certain other foods  Ask for more information about what to eat or not to eat  § You will need to see your healthcare provider for follow-up visits  You will need regular blood tests to decide how much warfarin you need  · Take your medicine as directed  Contact your healthcare provider if you think your medicine is not helping or if you have side effects  Tell him or her if you are allergic to any medicine  Keep a list of the medicines, vitamins, and herbs you take  Include the amounts, and when and why you take them  Bring the list or the pill bottles to follow-up visits  Carry your medicine list with you in case of an emergency  Prevent another PE:   · Wear pressure stockings  The stockings are tight and put pressure on your legs  This improves blood flow and helps prevent clots  Wear the stockings during the day  Do not wear them when you sleep  · Exercise regularly  Ask about the best exercise plan for you  When you travel by car or work at a desk, take breaks to stand up and move around as much as possible  Rotate your feet in circles often if you sit for a long period of time  · Maintain a healthy weight  Ask your healthcare provider how much you should weigh  Ask him to help you create a weight loss plan if you are overweight  · Do not smoke  Nicotine and other chemicals in cigarettes and cigars can damage blood vessels and increase your risk for another PE   Ask your healthcare provider for information if you currently smoke and need help to quit  E-cigarettes or smokeless tobacco still contain nicotine  Talk to your healthcare provider before you use these products  Follow up with your healthcare provider as directed:  Write down your questions so you remember to ask them during your visits  © 2017 [address] [employer] Information is for End User's use only and may not be sold, redistributed or otherwise used for commercial purposes  All illustrations and images included in CareNotes® are the copyrighted property of [employer]  or [address]  The above information is an  only  It is not intended as medical advice for individual conditions or treatments  Talk to your doctor, nurse or pharmacist before following any medical regimen to see if it is safe and effective for you

## 2018-05-20 NOTE — ED NOTES
Noted filed on wrong patient       Tim Vazquez RN  05/20/18 [facility] Frantz Tiwari RN  05/20/18 3217

## 2018-05-20 NOTE — PROGRESS NOTES
Received a phone call from the patient's father stating that the pharmacy did not have the Rx for Tramadol at his pharmacy, he thought it was called in and states the pharmacy did not have the order  He stated he did not pick this up on Friday, when his daughter was discharged, but returned to the ED and as per the pt's father Yury Garirdo,  He stated " per the ER Dr's, her breathing would improve if she had the Tramadol"  Rx called to the pharmacy on file , [facility] in [facility] as per Dr Rubio Simpson  Returned the phone call to Yury Garrido at [phone], to let him know this

## 2018-05-20 NOTE — ED NOTES
Patient transported to [address] [place] [address],[facility], RN  05/20/18 6016

## 2018-05-21 ENCOUNTER — TELEPHONE (OUTPATIENT)
Dept: ADMINISTRATIVE | Facility: OTHER | Age: 23
End: 2018-05-21

## 2018-05-21 NOTE — TELEPHONE ENCOUNTER
SPOKE WITH PATIENT AND ADVISED HER THAT SHE NEEDS TO BE SEEN IN ONE WEEK WITH HER PCP - PATIENT UNDERSTOOD AND STATED THAT SHE NEEDS TO TALK TO HER FATHER ABOUT FOLLOW UP   PROVIDED PATIENT WITH NAMES OF OTHER PROVIDERS AT THE PRACTICE SINCE SHE STATED THAT WHEN SHE CALLS TO SCHEDULE WITH DR Geoffrey Mar HER SCHEDULE IS FULL    PATIENT HAS [facility] PENDING

## 2018-05-25 ENCOUNTER — APPOINTMENT (OUTPATIENT)
Dept: LAB | Facility: HOSPITAL | Age: 23
End: 2018-05-25
Payer: COMMERCIAL

## 2018-05-25 ENCOUNTER — TRANSCRIBE ORDERS (OUTPATIENT)
Dept: HEMATOLOGY ONCOLOGY | Facility: MEDICAL CENTER | Age: 23
End: 2018-05-25

## 2018-05-25 ENCOUNTER — TELEPHONE (OUTPATIENT)
Dept: HEMATOLOGY ONCOLOGY | Facility: CLINIC | Age: 23
End: 2018-05-25

## 2018-05-25 DIAGNOSIS — R07.89 CHEST WALL PAIN: Primary | ICD-10-CM

## 2018-05-25 DIAGNOSIS — I26.09 OTHER PULMONARY EMBOLISM WITH ACUTE COR PULMONALE, UNSPECIFIED CHRONICITY (HCC): Primary | ICD-10-CM

## 2018-05-25 LAB
BASOPHILS # BLD AUTO: 0.03 THOUSANDS/ΜL (ref 0–0.1)
BASOPHILS NFR BLD AUTO: 0 % (ref 0–1)
EOSINOPHIL # BLD AUTO: 0.19 THOUSAND/ΜL (ref 0–0.61)
EOSINOPHIL NFR BLD AUTO: 3 % (ref 0–6)
ERYTHROCYTE [DISTWIDTH] IN BLOOD BY AUTOMATED COUNT: 12.1 % (ref 11.6–15.1)
HCT VFR BLD AUTO: 36.7 % (ref 34.8–46.1)
HGB BLD-MCNC: 12.2 G/DL (ref 11.5–15.4)
IMM GRANULOCYTES # BLD AUTO: 0.01 THOUSAND/UL (ref 0–0.2)
IMM GRANULOCYTES NFR BLD AUTO: 0 % (ref 0–2)
LYMPHOCYTES # BLD AUTO: 2.83 THOUSANDS/ΜL (ref 0.6–4.47)
LYMPHOCYTES NFR BLD AUTO: 41 % (ref 14–44)
MCH RBC QN AUTO: 31.4 PG (ref 26.8–34.3)
MCHC RBC AUTO-ENTMCNC: 33.2 G/DL (ref 31.4–37.4)
MCV RBC AUTO: 95 FL (ref 82–98)
MONOCYTES # BLD AUTO: 0.66 THOUSAND/ΜL (ref 0.17–1.22)
MONOCYTES NFR BLD AUTO: 10 % (ref 4–12)
NEUTROPHILS # BLD AUTO: 3.14 THOUSANDS/ΜL (ref 1.85–7.62)
NEUTS SEG NFR BLD AUTO: 46 % (ref 43–75)
NRBC BLD AUTO-RTO: 0 /100 WBCS
PLATELET # BLD AUTO: 427 THOUSANDS/UL (ref 149–390)
PMV BLD AUTO: 9.8 FL (ref 8.9–12.7)
RBC # BLD AUTO: 3.88 MILLION/UL (ref 3.81–5.12)
WBC # BLD AUTO: 6.86 THOUSAND/UL (ref 4.31–10.16)

## 2018-05-25 PROCEDURE — 36415 COLL VENOUS BLD VENIPUNCTURE: CPT

## 2018-05-25 PROCEDURE — 85025 COMPLETE CBC W/AUTO DIFF WBC: CPT

## 2018-05-25 NOTE — TELEPHONE ENCOUNTER
Telephone call  Donneta Cranker, 1995, [phone]  05/25/18      This is a 74-year-old female with past medical history of bilateral pulmonary embolisms diagnosed in mid May 2018 who recently complained of lower extremity red dots and follow-up emergency room visit  Patient was initially seen by Dr Chris Medrano during her hospitalization in May 2018  Patient was discharged and during a phone call, to set up a follow-up appointment she complained of the above  Patient notes that she was in the emergency room to be evaluated for this as well as continuation of chest wall pain  Patient was discharged from the emergency room after a CT scan demonstrating stable to improved thromboembolism burden  Patient was given a prescription of Percocet for the chest wall pain  During my phone conversation, the patient denies additional lower extremity red dots  Patient notes that the red dots did resolve after a few days  To be safe the patient was advised to follow up with CBC to rule out diminished platelet count  Lab Results   Component Value Date    WBC 6 86 05/25/2018    HGB 12 2 05/25/2018    HCT 36 7 05/25/2018    MCV 95 05/25/2018     (H) 05/25/2018     Patient's platelet count had increased which is consistent with inflammation  Likewise this rules out medication related thrombocytopenia  Additionally the patient continues to complain of chest wall pain  Patient states that Percocet has not helped  Patient denies taking additional over-the-counter medications  We discussed Flector patch, anti-inflammatory patch for which she could apply over top of the area of pain and can be applied twice a day  We discussed this medication at length as oral chronic use of ibuprofen is discouraged well on a blood thinner  I will make the covering provider aware of the above  Patient was advised to call the office with any additional questions or concerns    The patient voiced understanding and agreement to the above  Patient will follow up with Dr Alivia Moore in approximately 1 month but understands she can move up her appointment if she experiences any issues related to the above

## 2018-05-30 ENCOUNTER — OFFICE VISIT (OUTPATIENT)
Dept: FAMILY MEDICINE CLINIC | Facility: CLINIC | Age: 23
End: 2018-05-30
Payer: COMMERCIAL

## 2018-05-30 VITALS
WEIGHT: 124 LBS | BODY MASS INDEX: 19.93 KG/M2 | RESPIRATION RATE: 16 BRPM | SYSTOLIC BLOOD PRESSURE: 120 MMHG | HEIGHT: 66 IN | TEMPERATURE: 97.3 F | OXYGEN SATURATION: 98 % | HEART RATE: 88 BPM | DIASTOLIC BLOOD PRESSURE: 70 MMHG

## 2018-05-30 DIAGNOSIS — I82.412 ACUTE DEEP VEIN THROMBOSIS (DVT) OF FEMORAL VEIN OF LEFT LOWER EXTREMITY (HCC): ICD-10-CM

## 2018-05-30 DIAGNOSIS — I26.99 OTHER ACUTE PULMONARY EMBOLISM WITHOUT ACUTE COR PULMONALE (HCC): Primary | ICD-10-CM

## 2018-05-30 DIAGNOSIS — I26.99 BILATERAL PULMONARY EMBOLISM (HCC): ICD-10-CM

## 2018-05-30 PROCEDURE — 99214 OFFICE O/P EST MOD 30 MIN: CPT | Performed by: FAMILY MEDICINE

## 2018-05-30 NOTE — PROGRESS NOTES
Chief Complaint   Patient presents with    Follow-up        Patient ID: Trudy Duran is a 26 y o  female  HPI  recent hospital stay for b/l PE and L leg DVT -  On Eliquis  -  Slowly getting better, did not see hematologist or pulmonologist yet as out pt  -  Cont to have R sided CP and SOB with deep breathing and mild exertion     The following portions of the patient's history were reviewed and updated as appropriate: allergies, current medications, past family history, past medical history, past social history, past surgical history and problem list     Review of Systems   Constitutional: Negative  Respiratory: Positive for shortness of breath  Negative for cough, chest tightness and wheezing  Cardiovascular: Positive for chest pain and leg swelling  Negative for palpitations  Gastrointestinal: Negative  Musculoskeletal: Negative  Skin: Negative  Neurological: Negative  Current Outpatient Prescriptions   Medication Sig Dispense Refill    apixaban (ELIQUIS) 5 mg Take 1 tablet (5 mg total) by mouth 2 (two) times a day 60 tablet 5    Diclofenac Epolamine (FLECTOR) 1 3 % PTCH Place 1 patch on the skin 2 (two) times a day as needed (Chest wall pain) for up to 15 days 30 patch 1    ibuprofen (MOTRIN) 600 mg tablet Take by mouth every 6 (six) hours as needed for mild pain       No current facility-administered medications for this visit  Objective:    /70 (BP Location: Left arm, Patient Position: Sitting, Cuff Size: Standard)   Pulse 88   Temp (!) 97 3 °F (36 3 °C) (Tympanic)   Resp 16   Ht 5' 6" (1 676 m)   Wt 56 2 kg (124 lb)   LMP 05/02/2018 (Approximate)   SpO2 98% Comment: r/a  BMI 20 01 kg/m²        Physical Exam   Constitutional: She appears well-nourished  No distress  Cardiovascular: Normal rate and regular rhythm  No murmur heard  Pulmonary/Chest: Effort normal  No respiratory distress  She has decreased breath sounds  She has no wheezes   She has no rales  She exhibits no tenderness  Musculoskeletal: She exhibits edema  L leg swelling -  None pitting edema    Skin: Skin is warm  No erythema  Psychiatric: She has a normal mood and affect  Labs in chart were reviewed  Assessment/Plan:           Diagnoses and all orders for this visit:    Other acute pulmonary embolism without acute cor pulmonale (HCC)    Bilateral pulmonary embolism (HCC)  -     apixaban (ELIQUIS) 5 mg; Take 1 tablet (5 mg total) by mouth 2 (two) times a day        1  Other acute pulmonary embolism without acute cor pulmonale ([address] )     2  Bilateral pulmonary embolism (HCC)  apixaban (ELIQUIS) 5 mg    Ambulatory referral to Pulmonology   3   Acute deep vein thrombosis (DVT) of femoral vein of left lower extremity ([address] )             rto in 2 m   Will keep pt out of work until next troy               Grayson Runner, MD

## 2018-05-30 NOTE — LETTER
May 30, 2018     Patient: Juan Vasquez   YOB: 1995   Date of Visit: 5/30/2018       To Whom it May Concern:    Gurpreet Yuen is under my professional care  She was seen in my office on 5/30/2018  She may return to work on 7/30/18  If you have any questions or concerns, please don't hesitate to call           Sincerely,          Lexx Amador MD        CC: No Recipients

## 2018-06-07 RX ORDER — NORETHINDRONE ACETATE AND ETHINYL ESTRADIOL AND FERROUS FUMARATE 1MG-20(21)
KIT ORAL
Refills: 1 | COMMUNITY
Start: 2018-05-30 | End: 2018-07-09

## 2018-06-08 ENCOUNTER — OFFICE VISIT (OUTPATIENT)
Dept: PULMONOLOGY | Facility: MEDICAL CENTER | Age: 23
End: 2018-06-08
Payer: COMMERCIAL

## 2018-06-08 VITALS
HEART RATE: 68 BPM | SYSTOLIC BLOOD PRESSURE: 110 MMHG | OXYGEN SATURATION: 99 % | TEMPERATURE: 97.8 F | RESPIRATION RATE: 16 BRPM | BODY MASS INDEX: 20.83 KG/M2 | DIASTOLIC BLOOD PRESSURE: 68 MMHG | WEIGHT: 125 LBS | HEIGHT: 65 IN

## 2018-06-08 DIAGNOSIS — I82.412 ACUTE DEEP VEIN THROMBOSIS (DVT) OF FEMORAL VEIN OF LEFT LOWER EXTREMITY (HCC): ICD-10-CM

## 2018-06-08 DIAGNOSIS — R07.81 PLEURAL PAIN: ICD-10-CM

## 2018-06-08 DIAGNOSIS — I26.99 BILATERAL PULMONARY EMBOLISM (HCC): ICD-10-CM

## 2018-06-08 DIAGNOSIS — I26.99 OTHER ACUTE PULMONARY EMBOLISM WITHOUT ACUTE COR PULMONALE (HCC): Primary | ICD-10-CM

## 2018-06-08 PROCEDURE — 99204 OFFICE O/P NEW MOD 45 MIN: CPT | Performed by: INTERNAL MEDICINE

## 2018-06-09 PROBLEM — R07.81 PLEURAL PAIN: Status: ACTIVE | Noted: 2018-06-09

## 2018-06-10 NOTE — ASSESSMENT & PLAN NOTE
Guillaume Ashton does have some persistent right pleuritic chest pain from her pulmonary emboli and most recent CT of chest done May 20 shows some alveolar infiltrate in the periphery of the right lower lobe at the base likely due to acute lung injury and pulmonary scan may from her PE  This pain is gradually improving and her breathing is improved    I told her this pain may last for another 4 weeks but should continue to improve

## 2018-06-10 NOTE — ASSESSMENT & PLAN NOTE
Mary Lou Huddleston is a 26year old female who is just diagnosed on May 16, 2018 with bilateral pulmonary emboli with some larger clots in the right middle to right lower lobe pulmonary arteries and also some small amount of clot in the left lower lobe pulmonary artery  She does have some persistent right pleuritic chest pain but her shortness of breath with activity is improved  Her right pleuritic chest pain now is tolerable  Repeat CT of the chest done on May 20th she complains of onset this right pleuritic chest pain showed no new pulmonary emboli and some improvement in left lower lobe pulmonary artery  There was a alveolar infiltrate in the periphery of the right lower lobe at the base and this likely represents an area of pulmonary ischemia from her PE  This pain is improving  She also has extensive left lower extremity acute DVT and still some residual phlebitic pain from this  [place] had been taking some Motrin for pain by advised her not to take this  I did tell her to use just Tylenol for any pain as she would be at risk for a GI bleed with the ibuprofen  I instructed Michael to avoid all aspirin and NSAID's  I suspect she probably has an underlying thrombophilia such as Factor 5 Leiden mutation or other  She has an appointment soon with Dr Behzad Bell  I did speak with him and he plans on drawn a thrombophilia blood panel after he sees her  There is no special testing done when she was hospitalized  I will order a follow-up CTA of the chest to ensure that her pulmonary emboli resolved and that she does not have chronic thrombo embolic disease  I also went to re-evaluate the right lower lobe to see if the alveolar infiltrate in the posterior inferior portion of her lobe is just acute lung injury and ischemia from the PE or a pulmonary infarct  She will get this CT scan done in late July are about 2 months from her initial CT scans      Also with extent of her left lower extremity DVT from the iliac veins more distal there is a likelihood she will have some chronic venous insufficiency in her left leg  Duration of her anticoagulation will be at least 3 months minimum and she possibly may need long-term anticoagulation pending results of future blood work to will be ordered by Dr Lopez brown to evaluate for any underlying thrombophilia  She has a maternal uncle and maternal grandfather who had some type of blood clots  Her birth control pills were discontinued  She is tolerating the Eliquis

## 2018-06-10 NOTE — PROGRESS NOTES
Assessment/Plan:       Problem List Items Addressed This Visit        Respiratory    Pulmonary emboli ([facility] [address] ) - Primary     Jocelyne Durand is a 26year old female who is just diagnosed on May 16, 2018 with bilateral pulmonary emboli with some larger clots in the right middle to right lower lobe pulmonary arteries and also some small amount of clot in the left lower lobe pulmonary artery  She does have some persistent right pleuritic chest pain but her shortness of breath with activity is improved  Her right pleuritic chest pain now is tolerable  Repeat CT of the chest done on May 20th she complains of onset this right pleuritic chest pain showed no new pulmonary emboli and some improvement in left lower lobe pulmonary artery  There was a alveolar infiltrate in the periphery of the right lower lobe at the base and this likely represents an area of pulmonary ischemia from her PE  This pain is improving  She also has extensive left lower extremity acute DVT and still some residual phlebitic pain from this  Suzy Neil had been taking some Motrin for pain by advised her not to take this  I did tell her to use just Tylenol for any pain as she would be at risk for a GI bleed with the ibuprofen  I instructed Michael to avoid all aspirin and NSAID's  I suspect she probably has an underlying thrombophilia such as Factor 5 Leiden mutation or other  She has an appointment soon with Dr Jeffery Sweeney  I did speak with him and he plans on drawn a thrombophilia blood panel after he sees her  There is no special testing done when she was hospitalized  I will order a follow-up CTA of the chest to ensure that her pulmonary emboli resolved and that she does not have chronic thrombo embolic disease  I also went to re-evaluate the right lower lobe to see if the alveolar infiltrate in the posterior inferior portion of her lobe is just acute lung injury and ischemia from the PE or a pulmonary infarct    She will get this CT scan done in late July are about 2 months from her initial CT scans  Also with extent of her left lower extremity DVT from the iliac veins more distal there is a likelihood she will have some chronic venous insufficiency in her left leg  Duration of her anticoagulation will be at least 3 months minimum and she possibly may need long-term anticoagulation pending results of future blood work to will be ordered by Dr Jerald brown to evaluate for any underlying thrombophilia  She has a maternal uncle and maternal grandfather who had some type of blood clots  Her birth control pills were discontinued  She is tolerating the Eliquis  Cardiovascular and Mediastinum    Acute deep vein thrombosis (DVT) of femoral vein of left lower extremity (HCC)       Other    Pleural pain     Virgil Sheriff does have some persistent right pleuritic chest pain from her pulmonary emboli and most recent CT of chest done May 20 shows some alveolar infiltrate in the periphery of the right lower lobe at the base likely due to acute lung injury and pulmonary scan may from her PE  This pain is gradually improving and her breathing is improved  I told her this pain may last for another 4 weeks but should continue to improve           Other Visit Diagnoses     Bilateral pulmonary embolism ([address] )        Relevant Orders    CTA chest pe study            No Follow-up on file  All questions are answered to the patient's satisfaction and understanding  She verbalizes understanding  She is encouraged to call with any further questions or concerns  Portions of the record may have been created with voice recognition software  Occasional wrong word or "sound a like" substitutions may have occurred due to the inherent limitations of voice recognition software  Read the chart carefully and recognize, using context, where substitutions have occurred        ______________________________________________________________________    Chief Complaint:   Chief Complaint   Patient presents with    Pulmonary Embolism     Hosital May 16,2018        Patient ID: Lloyd Vital is a 26 y o  y o  female has a past medical history of DVT (deep venous thrombosis) ([facility] [address] ); Pulmonary embolism ([facility] [address] ); and Raynaud disease  6/8/2018       HPI     Lloyd Vital is a 26year-old female who presents for evaluation of recent acute bilateral pulmonary emboli and extensive left lower extremity DVT  She has no prior history of any venous thromboembolism  She states that on May she was started experience some dizziness that would wax and wane  She was not have any chest pain or shortness of Breath and  She denies any leg pain  She was diagnosed with vertigo  Dizziness persisted and then by May 16 she started have some mild shortness of breath and she knows that her heart rate was slightly elevated when she was doing any activity  She also noticed some mild wheezing in  She has a childhood history of mild asthma but not had problem and many years     She also started to experience some right mid to lower pleuritic type chest pain  She did not have any fever chills  A CTA of the chest was done and showed multiple bilateral pulmonary emboli particularly involving the right lower lobe pulmonary artery  There were no infiltrates or pleural effusions  She has also had some discomfort in her left thigh region  Venous Doppler studies done May 17 showed evidence of acute occlusive deep venous thrombosis in the left lower extremity  This involved the left external iliac, common iliac, common femoral, proximal femoral vein  There is also acute nonocclusive superficial thrombosis noted origin of the great saphenous vein  She was started on Lovenox subcutaneous 1 milligram/kilogram subcutaneous every 12 hr   Patient had been taking birth control pills and these were discontinued    Echocardiogram showed normal left ventricle systolic motion with ejection fraction of 60% and there was no evidence of any right ventricular strain or overload  She was admitted on May 16 and was discharge May 18, 2018  She was prescribed Eliquis 10 mg twice a day for 1 week followed by 5 mg twice a day  She 1 pack to emergency room on May 20th complaining of persistent feeling of some shortness of breath and having some pain in the right lateral and lower chest that was sharp and stabbing and hard for her to take a deep breath  She was also having pain in left thigh area where she was found to have DVT  She was taking Motrin without any relief of her pain  Another CTA of the chest was then done that day ER showed persistent moderate to large burden of pulmonary emboli in the right middle and right lower lobes and small amount of clot in left lower lobe  To some the clot left lower lobe had improved compared to previous study done on May 16  There is some patchy alveolar infiltrate in the periphery of the posterior aspect of right lower lobe inferiorly this likely represented ischemia from her PE  Her pain has improved since then and is tolerable  She still has some mild exertional dyspnea  She is taking her Eliquis  She denies any hemoptysis or any bloody stools  She is not having any dizziness  Prior to this recent hospitalization she had no significant medical history other than some mild childhood asthma which has not cause her problems in several years  She is a nonsmoker  She does state she has a maternal uncle and maternal grandfather who both had some type of thrombus  Occupational/Exposure history:     Review of Systems   Constitutional: Negative for chills, fever and unexpected weight change  HENT: Negative for congestion, rhinorrhea and sore throat  Eyes: Negative for discharge and redness  Respiratory: Negative for cough and wheezing  Has some persistent right-sided pleuritic type chest pain    Some mild shortness of breath with activity but this is improving Cardiovascular: Negative for chest pain and palpitations  Has some mild persistent edema of her left lower extremity and some mild tenderness in her left thigh   Gastrointestinal: Negative for abdominal distention, abdominal pain and nausea  Endocrine: Negative for polydipsia and polyphagia  Genitourinary: Negative for dysuria  Musculoskeletal: Negative for joint swelling and myalgias  Skin: Negative for rash  Neurological: Negative for light-headedness  Social history: She reports that she has never smoked  She has never used smokeless tobacco  She reports that she drinks alcohol  She reports that she does not use drugs  Past surgical history: No past surgical history on file    Family history:   Family History   Problem Relation Age of Onset    No Known Problems Mother     No Known Problems Father     Cancer Maternal Grandmother     Hypertension Maternal Grandmother        Immunization History   Administered Date(s) Administered    DTaP 5 1997, 1997, 1997, 07/10/1998, 05/16/2001    HPV Quadrivalent 12/30/2009, 03/01/2010, 08/09/2010    Hep A, adult 09/05/2006, 01/30/2008    Hep B, adult 1997, 1997, 10/09/1998    Hib (HbOC) 1997, 1997, 1997, 07/10/1998    IPV 1997, 1997, 05/16/2001    MMR 04/03/1998, 05/16/2001    Meningococcal, Unknown Serogroups 01/30/2008, 08/17/2015    OPV 04/03/1998    Td (adult), adsorbed 01/30/2008    Tuberculin Skin Test-PPD Intradermal 04/03/1998    Varicella 09/21/1998, 01/30/2008     Current Outpatient Prescriptions   Medication Sig Dispense Refill    ibuprofen (MOTRIN) 600 mg tablet Take by mouth every 6 (six) hours as needed for mild pain      apixaban (ELIQUIS) 5 mg Take 1 tablet (5 mg total) by mouth 2 (two) times a day 60 tablet 5    Diclofenac Epolamine (FLECTOR) 1 3 % PTCH Place 1 patch on the skin 2 (two) times a day as needed (Chest wall pain) for up to 15 days 30 patch 1  JUNEL  1/20 1-20 MG-MCG per tablet   1     No current facility-administered medications for this visit  Allergies: Patient has no known allergies  Objective:  Vitals:    06/08/18 1302   BP: 110/68   BP Location: Left arm   Patient Position: Sitting   Cuff Size: Standard   Pulse: 68   Resp: 16   Temp: 97 8 °F (36 6 °C)   TempSrc: Tympanic   SpO2: 99%   Weight: 56 7 kg (125 lb)   Height: 5' 5" (1 651 m)   Oxygen Therapy  SpO2: 99 %    Wt Readings from Last 3 Encounters:   06/08/18 56 7 kg (125 lb)   05/30/18 56 2 kg (124 lb)   05/20/18 56 7 kg (125 lb)     Body mass index is 20 8 kg/m²  Physical Exam   Constitutional: She is oriented to person, place, and time  She appears well-developed and well-nourished  No distress  No conversational dyspnea  , appears comfortable  HENT:   Head: Normocephalic  Nose: Nose normal    Mouth/Throat: Oropharynx is clear and moist  No oropharyngeal exudate  Eyes: Conjunctivae are normal  Pupils are equal, round, and reactive to light  Neck: Neck supple  No JVD present  No tracheal deviation present  Cardiovascular: Normal rate, regular rhythm and normal heart sounds  Pulmonary/Chest: Effort normal    Abdominal: Soft  She exhibits no distension  There is no tenderness  There is no guarding  Musculoskeletal: She exhibits no edema  Lymphadenopathy:     She has no cervical adenopathy  Neurological: She is alert and oriented to person, place, and time  Skin: Skin is warm and dry  No rash noted  Psychiatric: She has a normal mood and affect   Her behavior is normal  Thought content normal        Lab Review:   Transcribe Orders on 05/25/2018   Component Date Value    WBC 05/25/2018 6 86     RBC 05/25/2018 3 88     Hemoglobin 05/25/2018 12 2     Hematocrit 05/25/2018 36 7     MCV 05/25/2018 95     MCH 05/25/2018 31 4     MCHC 05/25/2018 33 2     RDW 05/25/2018 12 1     MPV 05/25/2018 9 8     Platelets 24/06/0217 427*    nRBC 05/25/2018 0     Neutrophils Relative 05/25/2018 46     Immat GRANS % 05/25/2018 0     Lymphocytes Relative 05/25/2018 41     Monocytes Relative 05/25/2018 10     Eosinophils Relative 05/25/2018 3     Basophils Relative 05/25/2018 0     Neutrophils Absolute 05/25/2018 3 14     Immature Grans Absolute 05/25/2018 0 01     Lymphocytes Absolute 05/25/2018 2 83     Monocytes Absolute 05/25/2018 0 66     Eosinophils Absolute 05/25/2018 0 19     Basophils Absolute 05/25/2018 0 03    Admission on 05/16/2018, Discharged on 05/18/2018   Component Date Value    WBC 05/16/2018 11 77*    RBC 05/16/2018 4 70     Hemoglobin 05/16/2018 14 3     Hematocrit 05/16/2018 43 9     MCV 05/16/2018 93     MCH 05/16/2018 30 4     MCHC 05/16/2018 32 6     RDW 05/16/2018 12 6     MPV 05/16/2018 10 0     Platelets 30/22/1544 186     nRBC 05/16/2018 0     Sodium 05/16/2018 133*    Potassium 05/16/2018 4 4     Chloride 05/16/2018 99*    CO2 05/16/2018 29     Anion Gap 05/16/2018 5     BUN 05/16/2018 10     Creatinine 05/16/2018 0 91     Glucose 05/16/2018 95     Calcium 05/16/2018 8 8     AST 05/16/2018 18     ALT 05/16/2018 38     Alkaline Phosphatase 05/16/2018 80     Total Protein 05/16/2018 7 2     Albumin 05/16/2018 3 2*    Total Bilirubin 05/16/2018 0 50     eGFR 05/16/2018 90     TSH 3RD GENERATON 05/16/2018 0 498     EXT PREG TEST UR (Ref: N* 05/16/2018 negative     LYME AB IGG 05/16/2018 0 16     LYME AB IGM 05/16/2018 0 75     Segmented % 05/16/2018 57     Bands % 05/16/2018 5     Lymphocytes % 05/16/2018 17     Monocytes % 05/16/2018 10     Eosinophils % 05/16/2018 0     Basophils % 05/16/2018 0     Myelocytes % 05/16/2018 1     Atypical Lymphocytes % 05/16/2018 10*    Absolute Neutrophils 05/16/2018 7 30     Lymphocytes Absolute 05/16/2018 2 00     Monocytes Absolute 05/16/2018 1 18     Eosinophils Absolute 05/16/2018 0 00     Basophils Absolute 05/16/2018 0 00     Total Counted 05/16/2018 100  Polychromasia 05/16/2018 Present     Platelet Estimate 39/84/1682 Adequate     Color, UA 05/16/2018 Yellow     Clarity, UA 05/16/2018 Clear     Specific Gravity, UA 05/16/2018 <=1 005     pH, UA 05/16/2018 6 5     Leukocytes, UA 05/16/2018 Trace*    Nitrite, UA 05/16/2018 Negative     Protein, UA 05/16/2018 Negative     Glucose, UA 05/16/2018 Negative     Ketones, UA 05/16/2018 Negative     Urobilinogen, UA 05/16/2018 0 2     Bilirubin, UA 05/16/2018 Negative     Blood, UA 05/16/2018 Small*    MRSA Culture Only 05/16/2018 No Methicillin Resistant Staphlyococcus aureus (MRSA) isolated     RBC, UA 05/16/2018 None Seen     WBC, UA 05/16/2018 2-4*    Epithelial Cells 05/16/2018 Moderate*    Bacteria, UA 05/16/2018 Moderate*    Sodium 05/17/2018 136     Potassium 05/17/2018 4 4     Chloride 05/17/2018 103     CO2 05/17/2018 29     Anion Gap 05/17/2018 4     BUN 05/17/2018 6     Creatinine 05/17/2018 0 78     Glucose 05/17/2018 108     Calcium 05/17/2018 8 4     eGFR 05/17/2018 109     WBC 05/17/2018 9 70     RBC 05/17/2018 4 18     Hemoglobin 05/17/2018 12 8     Hematocrit 05/17/2018 39 8     MCV 05/17/2018 95     MCH 05/17/2018 30 6     MCHC 05/17/2018 32 2     RDW 05/17/2018 12 8     MPV 05/17/2018 10 4     Platelets 40/57/9335 167     nRBC 05/17/2018 0     Neutrophils Relative 05/17/2018 54     Immat GRANS % 05/17/2018 0     Lymphocytes Relative 05/17/2018 31     Monocytes Relative 05/17/2018 12     Eosinophils Relative 05/17/2018 3     Basophils Relative 05/17/2018 0     Neutrophils Absolute 05/17/2018 5 16     Immature Grans Absolute 05/17/2018 0 04     Lymphocytes Absolute 05/17/2018 3 01     Monocytes Absolute 05/17/2018 1 14     Eosinophils Absolute 05/17/2018 0 32     Basophils Absolute 05/17/2018 0 03     Urine Culture 05/17/2018 No Growth <1000 cfu/mL     WBC 05/18/2018 9 94     RBC 05/18/2018 3 86     Hemoglobin 05/18/2018 12 1     Hematocrit 05/18/2018 36 6     MCV 05/18/2018 95     MCH 05/18/2018 31 3     MCHC 05/18/2018 33 1     RDW 05/18/2018 12 7     MPV 05/18/2018 10 6     Platelets 26/48/4218 178     nRBC 05/18/2018 0     Neutrophils Relative 05/18/2018 49     Immat GRANS % 05/18/2018 0     Lymphocytes Relative 05/18/2018 36     Monocytes Relative 05/18/2018 12     Eosinophils Relative 05/18/2018 3     Basophils Relative 05/18/2018 0     Neutrophils Absolute 05/18/2018 4 86     Immature Grans Absolute 05/18/2018 0 03     Lymphocytes Absolute 05/18/2018 3 57     Monocytes Absolute 05/18/2018 1 16     Eosinophils Absolute 05/18/2018 0 28     Basophils Absolute 05/18/2018 0 04     Sodium 05/18/2018 136     Potassium 05/18/2018 4 4     Chloride 05/18/2018 101     CO2 05/18/2018 28     Anion Gap 05/18/2018 7     BUN 05/18/2018 4*    Creatinine 05/18/2018 0 84     Glucose 05/18/2018 107     Calcium 05/18/2018 8 6     eGFR 05/18/2018 100     Protime 05/18/2018 10 2     INR 05/18/2018 0 97     PTT 05/18/2018 29     Ventricular Rate 05/16/2018 92     Atrial Rate 05/16/2018 92     MI Interval 05/16/2018 140     QRSD Interval 05/16/2018 84     QT Interval 05/16/2018 324     QTC Interval 05/16/2018 400     P Axis 05/16/2018 68     QRS Axis 05/16/2018 76     T Wave Axis 05/16/2018 48        Diagnostics:  I have personally reviewed pertinent films in PACS  CT of chest performed on 5/16 and 5/20/18  PE protocol revealed bilateral pulmonary emboli  There are some larger clots in the right middle lobe right lobe pulmonary arteries  Also on follow-up CT scan done May 22 was a new area of alveolar infiltrate the posterior peripheral part of the right lower lobe at the base  This likely represents some pulmonary ischemia from her PE  Ct Head Without Contrast    Result Date: 5/16/2018  Narrative: CT BRAIN - WITHOUT CONTRAST INDICATION:   Dizziness  COMPARISON:  None   TECHNIQUE:  CT examination of the brain was performed  In addition to axial images, coronal 2D reformatted images were created and submitted for interpretation  Radiation dose length product (DLP) for this visit:  1143 09 mGy-cm   This examination, like all CT scans performed in the [facility], was performed utilizing techniques to minimize radiation dose exposure, including the use of iterative reconstruction and automated exposure control  IMAGE QUALITY:  Diagnostic  FINDINGS: PARENCHYMA:  No intracranial mass, mass effect or midline shift  No CT signs of acute infarction  No acute intracranial hemorrhage  VENTRICLES AND EXTRA-AXIAL SPACES:  Normal for the patient's age  VISUALIZED ORBITS AND PARANASAL SINUSES:  Unremarkable  CALVARIUM AND EXTRACRANIAL SOFT TISSUES:  Normal      Impression: No acute intracranial abnormality  Workstation performed: [id number]     Cta Ed Chest Pe Study    Result Date: 5/20/2018  Narrative: CTA - CHEST WITH IV CONTRAST - PULMONARY ANGIOGRAM INDICATION:   Chest pain, acute, PE suspected, high pretest prob  COMPARISON: May 17, 2018 TECHNIQUE: CTA examination of the chest was performed using angiographic technique according to a protocol specifically tailored to evaluate for pulmonary embolism  Axial, sagittal, and coronal 2D reformatted images were created from the source data and  submitted for interpretation  In addition, coronal 3D MIP postprocessing was performed on the acquisition scanner  Radiation dose length product (DLP) for this visit:  358 mGy-cm   This examination, like all CT scans performed in the [facility], was performed utilizing techniques to minimize radiation dose exposure, including the use of iterative reconstruction and automated exposure control  IV Contrast:  85 mL of iohexol (OMNIPAQUE)  FINDINGS: PULMONARY ARTERIAL TREE:  Large burden right lower lobe and right middle lobe pulmonary arterial pulmonary embolism is unchanged    Improved left lower lobe small burden pulmonary embolus  LUNGS:  Right basilar opacity likely related to pulmonary infarct from pulmonary embolism  PLEURA:  Unremarkable  HEART/AORTA:  No evidence of abnormal right heart strain  MEDIASTINUM AND NISA:  Unremarkable  CHEST WALL AND LOWER NECK:   Unremarkable  VISUALIZED STRUCTURES IN THE UPPER ABDOMEN:  Unremarkable  OSSEOUS STRUCTURES:  No acute fracture or destructive osseous lesion  Impression: Unchanged large burden right lower lobe and right middle lobe pulmonary arterial pulmonary embolism  Improved left lower lobe small burden pulmonary embolus  No evidence of right heart strain  New right basilar opacity likely related to pulmonary infarct from pulmonary embolism  Pulmonary embolism findings were already discussed with Mathieu Ba on 5/16/2018 at 5:53 PM  Workstation performed: [id number]     Cta Ed Chest Pe Study    Result Date: 5/16/2018  Narrative: CTA - CHEST WITH IV CONTRAST - PULMONARY ANGIOGRAM INDICATION:   "a 44-year-old female who presents with a complaint of approximately 2 weeks ago started having episodes of room spinning around dizziness feeling off balance and generalized malaise""Pulmonary/Chest: Breath sounds normal  Tachypnea noted  "  COMPARISON: None  TECHNIQUE: CTA examination of the chest was performed using angiographic technique according to a protocol specifically tailored to evaluate for pulmonary embolism  Axial, sagittal, and coronal 2D reformatted images were created from the source data and  submitted for interpretation  In addition, coronal 3D MIP postprocessing was performed on the acquisition scanner  Radiation dose length product (DLP) for this visit:  464 23 mGy-cm   This examination, like all CT scans performed in the [facility], was performed utilizing techniques to minimize radiation dose exposure, including the use of iterative  reconstruction and automated exposure control   IV Contrast:  85 mL of iohexol (OMNIPAQUE)  350 Multi-dose  FINDINGS: PULMONARY ARTERIAL TREE:  Large quantity of right-sided pulmonary arterial embolism extending from the distal aspect of the right pulmonary artery is dominantly into the right middle lobe and right lower lobe pulmonary arterial vasculature  Small quantity of scattered left-sided pulmonary embolus  LUNGS:  Lungs are clear  There is no tracheal or endobronchial lesion  PLEURA:  Unremarkable  HEART/AORTA:  Unremarkable for patient's age  MEDIASTINUM AND NISA:  Unremarkable  CHEST WALL AND LOWER NECK:   Unremarkable  VISUALIZED STRUCTURES IN THE UPPER ABDOMEN:  Unremarkable  OSSEOUS STRUCTURES:  No acute fracture or destructive osseous lesion  Impression: Large quantity of right and small quantity of left acute pulmonary arterial embolism  I personally discussed this study with Karuna Casanova on 5/16/2018 at 5:53 PM  Workstation performed: [id number]     Vas Lower Limb Venous Duplex Study, Complete Bilateral    Result Date: 5/17/2018  Narrative:  [facility] REPORT CLINICAL: Indications: Pulmonary Emboli [[id number]]  Patient presents with left lower extremity pain x several days   Clinical:  FINDINGS:  Segment         Right            Left                            Impression       Impression              GSV Inguinal                     Non Occlusive Thrombus  Comm_Iliac                       Occlusive Subsegmental  GSV Prox Thigh                   Normal (Patent)         GSV Mid Thigh                    Normal (Patent)         Ext_Iliac                        Occlusive Subsegmental  CFV             Normal (Patent)  Occlusive Subsegmental  FV Prox                          Occlusive Subsegmental  FV Mid                           Normal (Patent)         FV Dist                          Normal (Patent)         PFV                              Occlusive Subsegmental  Popliteal                        Normal (Patent)            CONCLUSION: Impression: RIGHT LOWER LIMB: No evidence of acute or chronic deep vein thrombosis  No evidence of superficial thrombophlebitis noted  Doppler evaluation shows a normal response to augmentation maneuvers  Popliteal, posterior tibial and anterior tibial arterial Doppler waveforms are triphasic  LEFT LOWER LIMB: Acute Occlusive Deep vein thrombosis in the external iliac, common iliac, common femoral, proximal femoral vein  Acute non-occlusive Superficial thrombosis noted at the origin of the great saphenous vein  No evidence of acute or chronic deep vein thrombosis in the mid and distal femoral vein,popliteal vein  and calf veins  No evidence of superficial thrombophlebitis noted in the small saphenous vein  Doppler evaluation shows a normal response to respiration maneuvers  Popliteal, posterior tibial and anterior tibial arterial Doppler waveforms are triphasic  Technical findings were given to Dr Porfirio Pedroza  13:15  SIGNATURE: Electronically Signed by: Gaetana Meckel, MD on 2018-05-17 07:47:00 PM    Vas Ivc/iliac Veins Duplex; Complete Study    Result Date: 5/17/2018  Narrative:  [facility] REPORT [facility]INICAL: Indications:  Acute embolism and thrombosis of left iliac vein [I82 [id number]]   Clinical:  FINDINGS:  Unilateral      SI               Suprarenal IVC  Normal (Patent)  Distal IVC      Normal (Patent)   Left        SI                      Ext_Iliac   Occlusive Subsegmental  Comm_Iliac  Occlusive Subsegmental     CONCLUSION: Impression The Inferior vena cava  is patent with flow  Acute occlusive deep vein thrombosis in the left external and common iliac vein  SIGNATURE: Electronically Signed by: Gaetana Meckel, MD on 2018-05-17 07:46:45 PM

## 2018-06-12 ENCOUNTER — TELEPHONE (OUTPATIENT)
Dept: FAMILY MEDICINE CLINIC | Facility: CLINIC | Age: 23
End: 2018-06-12

## 2018-06-12 NOTE — TELEPHONE ENCOUNTER
Dr Chantelle Cooper,     Patient said she is feeling really tired lately, always exhausted and she was wondering if that was a side effect to her medication or if it is something she should be worried about  Please call patient back and advise  Thank you

## 2018-06-12 NOTE — TELEPHONE ENCOUNTER
Called pt- she reports sleeping 10-12 hours and then still feeling fatigued- Patient advised- not a side effect of medication- will experience fatigue due to dx- encouraged patient to schedule appointment if sxs worsen or if patient is concerned for present sxs- patient will monitor and call if needed-

## 2018-06-13 ENCOUNTER — TELEPHONE (OUTPATIENT)
Dept: FAMILY MEDICINE CLINIC | Facility: CLINIC | Age: 23
End: 2018-06-13

## 2018-06-13 NOTE — TELEPHONE ENCOUNTER
Dr Archie Silva,    Patient said that she fell outside and she twisted her ankle and it's swollen a little bit but was wondering if there was anything she should be worried about with the medication that she is taking?

## 2018-06-18 ENCOUNTER — OFFICE VISIT (OUTPATIENT)
Dept: HEMATOLOGY ONCOLOGY | Facility: MEDICAL CENTER | Age: 23
End: 2018-06-18
Payer: COMMERCIAL

## 2018-06-18 VITALS
WEIGHT: 124 LBS | BODY MASS INDEX: 20.66 KG/M2 | TEMPERATURE: 98.7 F | SYSTOLIC BLOOD PRESSURE: 112 MMHG | DIASTOLIC BLOOD PRESSURE: 70 MMHG | HEIGHT: 65 IN | HEART RATE: 64 BPM | RESPIRATION RATE: 18 BRPM

## 2018-06-18 DIAGNOSIS — I26.99 OTHER ACUTE PULMONARY EMBOLISM WITHOUT ACUTE COR PULMONALE (HCC): ICD-10-CM

## 2018-06-18 DIAGNOSIS — O22.30 DVT (DEEP VEIN THROMBOSIS) IN PREGNANCY: Primary | ICD-10-CM

## 2018-06-18 DIAGNOSIS — I82.4Y2 ACUTE DEEP VEIN THROMBOSIS (DVT) OF PROXIMAL VEIN OF LEFT LOWER EXTREMITY (HCC): ICD-10-CM

## 2018-06-18 PROCEDURE — 99214 OFFICE O/P EST MOD 30 MIN: CPT | Performed by: INTERNAL MEDICINE

## 2018-06-18 NOTE — PROGRESS NOTES
[facility]  1995  [address] [facility] [place] [phone]    DISCUSSION  SUMMARY:    74-year-old female recently admitted to the hospital with shortness of breath, lightheadedness and dizziness  Workup demonstrated left lower extremity DVT and bilateral PE  Issues:    1  Left lower extremity DVT and bilateral PE  Patient is now on Eliquis  Patient will continue to monitor for excessive bruising or bleeding  There was never any etiology for the thromboses  Patient will eventually undergo a thrombophilia evaluation  The left lower extremity is still slightly swollen  Patient is to use a compression stocking - ordered  Patient also has dyspnea on exertion but no shortness of breath at rest   Eventually the CTA/chest will need to be repeated (not now, too early)  We rediscussed what patient needs to monitor for in regard to lower extremity swelling, pain, cords or any acute pulmonary issues  If the pulmonary issues worsen, patient is to call the office immediately  2  Dizziness, abdominal pain on admission - resolved  3  Heavy menstrual periods  This is obviously associated with the anticoagulation  We discussed what to monitor for in regard to progressive anemia  Patient is to start an iron supplement monitoring for GI side effects  Patient is to return in 4 weeks   Patient knows to call the hematology/oncology office if there are any other questions or concerns  Carefully review your medication list and verify that the list is accurate and up-to-date  Please call the hematology/oncology office if there are medications missing from the list, medications on the list that you are not currently taking or if there is a dosage or instruction that is different from how you're taking that medication      Patient goals and areas of care:  Continue with anticoagulation  Patient is able to self-care   _____________________________________________________________________________________    Chief Complaint   Patient presents with    Follow-up     Left lower extremity DVT and PE on Eliquis       History of Present Illness:    37-year-old female recently admitted to the hospital with dizziness, abdominal pain and lightheadedness  Workup demonstrated extensive left lower extremity DVT and bilateral PE predominantly on the right  Patient was treated with Lovenox initially, subsequently placed on Eliquis  Patient stabilized and was discharged and returns for follow-up  Mrs Roger Hoff states +/-  Left lower extremity is still swollen, more so when patient is active  Patient has no shortness of breath at rest but dyspnea on exertion (52 yd)  Patient still has fatigue, is not back to 100%  Patient is not working  No headaches or dizziness, no syncopal episodes  Patient also has issues with heavy menstrual periods, interval is normal  No fevers, chills or sweats  Appetite is okay, no other GI or  issues  Review of Systems   Constitutional: Positive for fatigue  HENT: Negative  Eyes: Negative  Respiratory: Negative          + MUIR   Cardiovascular: Positive for leg swelling  Gastrointestinal: Negative  Endocrine: Negative  Genitourinary: Positive for menstrual problem  Musculoskeletal: Negative  Skin: Negative  Allergic/Immunologic: Negative  Neurological: Negative  Hematological: Negative  Psychiatric/Behavioral: Negative  All other systems reviewed and are negative       Patient Active Problem List   Diagnosis    Pulmonary emboli (HCC)    Hyponatremia    SIRS (systemic inflammatory response syndrome) (HCC)    Leukocytosis    Dizziness    Abdominal pain    Acute deep vein thrombosis (DVT) of femoral vein of left lower extremity (HCC)    Pleural pain     Past Medical History:   Diagnosis Date    [facility] [address] [facility]     [facility]     Raynaud disease      No past surgical history on file  Family History   Problem Relation Age of Onset    No Known Problems Mother     No Known Problems Father     Cancer Maternal Grandmother     Hypertension Maternal Grandmother      Social History     Social History    Marital status: Single     Spouse name: N/A    Number of children: N/A    Years of education: N/A     Occupational History    Not on file  Social History Main Topics    Smoking status: Never Smoker    Smokeless tobacco: Never Used    Alcohol use Yes      Comment: socially    Drug use: No    Sexual activity: Yes      Comment: birth control pills      Other Topics Concern    Not on file     Social History Narrative    No narrative on file       Current Outpatient Prescriptions:     Acetaminophen (TYLENOL) 325 MG CAPS, Take by mouth, Disp: , Rfl:     apixaban (ELIQUIS) 5 mg, Take 1 tablet (5 mg total) by mouth 2 (two) times a day, Disp: 60 tablet, Rfl: 5    JUNEL FE 1/20 1-20 MG-MCG per tablet, , Disp: , Rfl: 1    Diclofenac Epolamine (FLECTOR) 1 3 % PTCH, Place 1 patch on the skin 2 (two) times a day as needed (Chest wall pain) for up to 15 days, Disp: 30 patch, Rfl: 1    ibuprofen (MOTRIN) 600 mg tablet, Take by mouth every 6 (six) hours as needed for mild pain, Disp: , Rfl:     No Known Allergies    Vitals:    06/18/18 1148   BP: 112/70   Pulse: 64   Resp: 18   Temp: 98 7 °F (37 1 °C)     Physical Exam   Constitutional: She is oriented to person, place, and time  She appears well-developed and well-nourished  Well-nourished young female, no respiratory distress   HENT:   Head: Normocephalic and atraumatic  Right Ear: External ear normal    Left Ear: External ear normal    Nose: Nose normal    Mouth/Throat: Oropharynx is clear and moist    Eyes: Conjunctivae and EOM are normal  Pupils are equal, round, and reactive to light  Neck: Normal range of motion  Neck supple     Cardiovascular: Normal rate, regular rhythm, normal heart sounds and intact distal pulses  Pulmonary/Chest: Effort normal and breath sounds normal    Good air entry bilaterally, clear bilaterally   Abdominal: Soft  Bowel sounds are normal    Soft, nontender, no hepatosplenomegaly, +bowel sounds   Musculoskeletal: Normal range of motion  Neurological: She is alert and oriented to person, place, and time  She has normal reflexes  Skin: Skin is warm  Warm, moist, good color, no petechiae or ecchymoses, well tanned   Psychiatric: She has a normal mood and affect  Her behavior is normal  Judgment and thought content normal    Extremities:  No right lower extremity edema, left lower extremity is still swollen, 1+, no cords, pulses are 1+ bilaterally (see pic below - patient consented to pic)  Lymphatics:  No adenopathy in the neck, supraclavicular region, axilla and groin bilaterally          Labs:    05/25/2018 WBC = 6 8 hemoglobin = 12 2 hematocrit = 36 7 MCV = 95 platelet = 256 neutrophil = 46% lymphocytes = 41% monocyte = 10% BUN = 4 creatinine = 0 84    Imaging    05/17/2018 vascular lower limb duplex study bilateral     RIGHT LOWER LIMB:  No evidence of acute or chronic deep vein thrombosis  No evidence of superficial thrombophlebitis noted  Doppler evaluation shows a normal response to augmentation maneuvers  Popliteal, posterior tibial and anterior tibial arterial Doppler waveforms are  triphasic      LEFT LOWER LIMB:  Acute Occlusive Deep vein thrombosis in the external iliac, common iliac,  common femoral, proximal femoral vein  Acute non-occlusive Superficial thrombosis noted at the origin of the great  saphenous vein  No evidence of acute or chronic deep vein thrombosis in the mid and distal  femoral vein,popliteal vein  and calf veins  No evidence of superficial thrombophlebitis noted in the small saphenous vein  Doppler evaluation shows a normal response to respiration maneuvers    Popliteal, posterior tibial and anterior tibial arterial Doppler waveforms are  triphasic      05/16/2018 CTA chest PE study     PULMONARY ARTERIAL TREE:  Large quantity of right-sided pulmonary arterial embolism extending from the distal aspect of the right pulmonary artery is dominantly into the right middle lobe and right lower lobe pulmonary arterial vasculature   Small quantity of scattered left-sided pulmonary embolus      IMPRESSION: Large quantity of right and small quantity of left acute pulmonary arterial embolism      05/16/2018 CT head without contrast impression stated no acute intracranial abnormality

## 2018-06-25 ENCOUNTER — APPOINTMENT (EMERGENCY)
Dept: RADIOLOGY | Facility: HOSPITAL | Age: 23
End: 2018-06-25
Payer: COMMERCIAL

## 2018-06-25 ENCOUNTER — TELEPHONE (OUTPATIENT)
Dept: FAMILY MEDICINE CLINIC | Facility: CLINIC | Age: 23
End: 2018-06-25

## 2018-06-25 ENCOUNTER — HOSPITAL ENCOUNTER (EMERGENCY)
Facility: HOSPITAL | Age: 23
Discharge: HOME | End: 2018-06-25
Attending: EMERGENCY MEDICINE
Payer: COMMERCIAL

## 2018-06-25 ENCOUNTER — TELEPHONE (OUTPATIENT)
Dept: HEMATOLOGY ONCOLOGY | Facility: MEDICAL CENTER | Age: 23
End: 2018-06-25

## 2018-06-25 ENCOUNTER — APPOINTMENT (EMERGENCY)
Dept: RADIOLOGY | Facility: HOSPITAL | Age: 23
End: 2018-06-25
Attending: EMERGENCY MEDICINE
Payer: COMMERCIAL

## 2018-06-25 VITALS
RESPIRATION RATE: 18 BRPM | HEIGHT: 65 IN | TEMPERATURE: 100.4 F | OXYGEN SATURATION: 99 % | HEART RATE: 81 BPM | BODY MASS INDEX: 20.83 KG/M2 | WEIGHT: 125 LBS | DIASTOLIC BLOOD PRESSURE: 68 MMHG | SYSTOLIC BLOOD PRESSURE: 123 MMHG

## 2018-06-25 DIAGNOSIS — I82.409 DVT (DEEP VENOUS THROMBOSIS) (HCC): ICD-10-CM

## 2018-06-25 DIAGNOSIS — R00.2 PALPITATIONS: Primary | ICD-10-CM

## 2018-06-25 LAB
ALBUMIN SERPL BCP-MCNC: 4.1 G/DL (ref 3.5–5)
ALP SERPL-CCNC: 81 U/L (ref 46–116)
ALT SERPL W P-5'-P-CCNC: 24 U/L (ref 12–78)
ANION GAP SERPL CALCULATED.3IONS-SCNC: 6 MMOL/L (ref 4–13)
AST SERPL W P-5'-P-CCNC: 15 U/L (ref 5–45)
BACTERIA UR QL AUTO: ABNORMAL /HPF
BASOPHILS # BLD AUTO: 0.04 THOUSANDS/ΜL (ref 0–0.1)
BASOPHILS NFR BLD AUTO: 0 % (ref 0–1)
BILIRUB SERPL-MCNC: 0.5 MG/DL (ref 0.2–1)
BILIRUB UR QL STRIP: NEGATIVE
BUN SERPL-MCNC: 10 MG/DL (ref 5–25)
CALCIUM SERPL-MCNC: 9.3 MG/DL (ref 8.3–10.1)
CHLORIDE SERPL-SCNC: 102 MMOL/L (ref 100–108)
CLARITY UR: ABNORMAL
CO2 SERPL-SCNC: 27 MMOL/L (ref 21–32)
COLOR UR: ABNORMAL
CREAT SERPL-MCNC: 0.75 MG/DL (ref 0.6–1.3)
EOSINOPHIL # BLD AUTO: 0.09 THOUSAND/ΜL (ref 0–0.61)
EOSINOPHIL NFR BLD AUTO: 1 % (ref 0–6)
ERYTHROCYTE [DISTWIDTH] IN BLOOD BY AUTOMATED COUNT: 12.4 % (ref 11.6–15.1)
EXT PREG TEST URINE: NEGATIVE
GFR SERPL CREATININE-BSD FRML MDRD: 114 ML/MIN/1.73SQ M
GLUCOSE SERPL-MCNC: 94 MG/DL (ref 65–140)
GLUCOSE UR STRIP-MCNC: NEGATIVE MG/DL
HCT VFR BLD AUTO: 39.7 % (ref 34.8–46.1)
HGB BLD-MCNC: 13.4 G/DL (ref 11.5–15.4)
HGB UR QL STRIP.AUTO: ABNORMAL
IMM GRANULOCYTES # BLD AUTO: 0.03 THOUSAND/UL (ref 0–0.2)
IMM GRANULOCYTES NFR BLD AUTO: 0 % (ref 0–2)
KETONES UR STRIP-MCNC: NEGATIVE MG/DL
LEUKOCYTE ESTERASE UR QL STRIP: ABNORMAL
LYMPHOCYTES # BLD AUTO: 2.96 THOUSANDS/ΜL (ref 0.6–4.47)
LYMPHOCYTES NFR BLD AUTO: 26 % (ref 14–44)
MCH RBC QN AUTO: 30.9 PG (ref 26.8–34.3)
MCHC RBC AUTO-ENTMCNC: 33.8 G/DL (ref 31.4–37.4)
MCV RBC AUTO: 92 FL (ref 82–98)
MONOCYTES # BLD AUTO: 0.79 THOUSAND/ΜL (ref 0.17–1.22)
MONOCYTES NFR BLD AUTO: 7 % (ref 4–12)
NEUTROPHILS # BLD AUTO: 7.44 THOUSANDS/ΜL (ref 1.85–7.62)
NEUTS SEG NFR BLD AUTO: 66 % (ref 43–75)
NITRITE UR QL STRIP: NEGATIVE
NON-SQ EPI CELLS URNS QL MICRO: ABNORMAL /HPF
NRBC BLD AUTO-RTO: 0 /100 WBCS
PH UR STRIP.AUTO: 6 [PH] (ref 5–9)
PLATELET # BLD AUTO: 268 THOUSANDS/UL (ref 149–390)
PMV BLD AUTO: 10.2 FL (ref 8.9–12.7)
POTASSIUM SERPL-SCNC: 3.7 MMOL/L (ref 3.5–5.3)
PROT SERPL-MCNC: 7.4 G/DL (ref 6.4–8.2)
PROT UR STRIP-MCNC: NEGATIVE MG/DL
RBC # BLD AUTO: 4.34 MILLION/UL (ref 3.81–5.12)
RBC #/AREA URNS AUTO: ABNORMAL /HPF
SODIUM SERPL-SCNC: 135 MMOL/L (ref 136–145)
SP GR UR STRIP.AUTO: <=1.005 (ref 1–1.03)
UROBILINOGEN UR QL STRIP.AUTO: 0.2 E.U./DL
WBC # BLD AUTO: 11.35 THOUSAND/UL (ref 4.31–10.16)
WBC #/AREA URNS AUTO: ABNORMAL /HPF

## 2018-06-25 PROCEDURE — 99284 EMERGENCY DEPT VISIT MOD MDM: CPT

## 2018-06-25 PROCEDURE — 36415 COLL VENOUS BLD VENIPUNCTURE: CPT | Performed by: EMERGENCY MEDICINE

## 2018-06-25 PROCEDURE — 81025 URINE PREGNANCY TEST: CPT

## 2018-06-25 PROCEDURE — 71046 X-RAY EXAM CHEST 2 VIEWS: CPT

## 2018-06-25 PROCEDURE — 93971 EXTREMITY STUDY: CPT | Performed by: SURGERY

## 2018-06-25 PROCEDURE — 85025 COMPLETE CBC W/AUTO DIFF WBC: CPT | Performed by: EMERGENCY MEDICINE

## 2018-06-25 PROCEDURE — 93005 ELECTROCARDIOGRAM TRACING: CPT

## 2018-06-25 PROCEDURE — 81001 URINALYSIS AUTO W/SCOPE: CPT | Performed by: EMERGENCY MEDICINE

## 2018-06-25 PROCEDURE — 93971 EXTREMITY STUDY: CPT

## 2018-06-25 PROCEDURE — 80053 COMPREHEN METABOLIC PANEL: CPT | Performed by: EMERGENCY MEDICINE

## 2018-06-25 RX ORDER — ACETAMINOPHEN 325 MG/1
650 TABLET ORAL ONCE
Status: COMPLETED | OUTPATIENT
Start: 2018-06-25 | End: 2018-06-25

## 2018-06-25 RX ADMIN — ACETAMINOPHEN 650 MG: 325 TABLET ORAL at 15:48

## 2018-06-25 NOTE — TELEPHONE ENCOUNTER
Patient called back  She went to [facility] and was fitted for proper compression stocking but will not be available for 7 days  Pt will go to ED now for eval of worsening lower edema and pain

## 2018-06-25 NOTE — ED PROVIDER NOTES
History  Chief Complaint   Patient presents with    Leg Pain     Was DX with DVT and PE on 5/17/18 hematologist sent patient to the ED due to left leg still being swollen     27 yo female with hx of dvt to left leg with ongoing swelling to left leg  Patient saw hematologist and was told to use compression stocking  Patient sates no improvement  Patient sdenies any new injuries  Patient is compliant with PE medications and is currently eliquis  History provided by:  Patient   used: No        Prior to Admission Medications   Prescriptions Last Dose Informant Patient Reported? Taking? Acetaminophen (TYLENOL) 325 MG CAPS Unknown at Unknown time  Yes No   Sig: Take by mouth   JUNEL FE 1/20 1-20 MG-MCG per tablet 6/25/2018 at 0830  Yes Yes   apixaban (ELIQUIS) 5 mg 6/25/2018 at 0830  No Yes   Sig: Take 1 tablet (5 mg total) by mouth 2 (two) times a day      Facility-Administered Medications: None       Past Medical History:   Diagnosis Date    DVT (deep venous thrombosis) (HCC)     Pulmonary embolism (HCC)     Raynaud disease        History reviewed  No pertinent surgical history  Family History   Problem Relation Age of Onset    No Known Problems Mother     No Known Problems Father     Cancer Maternal Grandmother     Hypertension Maternal Grandmother      I have reviewed and agree with the history as documented  Social History   Substance Use Topics    Smoking status: Never Smoker    Smokeless tobacco: Never Used    Alcohol use Yes      Comment: socially        Review of Systems   All other systems reviewed and are negative  Physical Exam  Physical Exam   Constitutional: She is oriented to person, place, and time  She appears well-developed  HENT:   Head: Normocephalic and atraumatic  Eyes: EOM are normal  Pupils are equal, round, and reactive to light  Neck: Normal range of motion  Neck supple  Cardiovascular: Normal rate and regular rhythm  Pulmonary/Chest: Effort normal and breath sounds normal    Abdominal: Soft  Bowel sounds are normal    Musculoskeletal: Normal range of motion  She exhibits tenderness  (+) left calf tenderness with mild swelling  No erythema  No crepitus  Positive pulses equal bilateral at DP/PT  No signs of infection  Neurological: She is alert and oriented to person, place, and time  Skin: Skin is warm and dry  Capillary refill takes less than 2 seconds  Psychiatric: She has a normal mood and affect  Her behavior is normal    Nursing note and vitals reviewed  Vital Signs  ED Triage Vitals [06/25/18 1410]   Temperature Pulse Respirations Blood Pressure SpO2   100 4 °F (38 °C) 97 18 124/67 100 %      Temp Source Heart Rate Source Patient Position - Orthostatic VS BP Location FiO2 (%)   Tympanic Monitor Sitting Right arm --      Pain Score       4           Vitals:    06/25/18 1410   BP: 124/67   Pulse: 97   Patient Position - Orthostatic VS: Sitting       Visual Acuity      ED Medications  Medications - No data to display    Diagnostic Studies  Results Reviewed     None                 No orders to display              Procedures  ECG 12 Lead Documentation  Date/Time: 6/25/2018 4:54 PM  Performed by: Raven German  Authorized by: Raven German     Rate:     ECG rate:  72  Rhythm:     Rhythm: sinus rhythm    QRS:     QRS axis:  Normal    QRS intervals:  Normal  Conduction:     Conduction: normal    ST segments:     ST segments:  Normal  T waves:     T waves: normal             Phone Contacts  ED Phone Contact    ED Course                               MDM  Number of Diagnoses or Management Options  Diagnosis management comments: Leg swelling with history DVT  Case discussed with physician assistant from Dr Eben Libman office  Would like to repeat ultrasound of leg  Case signed out to Dr Barry Frost at 1607 hours pending results, reevaluation and disposition       Discussed with Latoya Villalba from Dr Roz Boxer office and okay to discharge from DVT point view  Will check chest x-ray and urinalysis given temperature 100 4  Tylenol administered  Case signed out  Amount and/or Complexity of Data Reviewed  Clinical lab tests: ordered and reviewed  Tests in the medicine section of CPT®: ordered and reviewed    Risk of Complications, Morbidity, and/or Mortality  Presenting problems: high  Diagnostic procedures: high  Management options: high    Patient Progress  Patient progress: stable    CritCare Time    Disposition  Final diagnoses:   None     ED Disposition     None      Follow-up Information    None         Patient's Medications   Discharge Prescriptions    No medications on file     No discharge procedures on file      ED Provider  Electronically Signed by           Joelle Banuelos MD  06/25/18 [id number]

## 2018-06-25 NOTE — TELEPHONE ENCOUNTER
Pt states she got an otc compression stocking at [facility]  She is not sure if it is the correct size  When she wears it, her leg is more swollen and painful  She will go to [facility] pharmacy (i will fax rx to them) for proper fitting  I advised pt if swelling and pain is worse then when she was seen in f/u with Dr Phan Cap pt should go to ED for eval  She was agreeable

## 2018-06-25 NOTE — DISCHARGE INSTRUCTIONS
Heart Palpitations   WHAT YOU NEED TO KNOW:   Heart palpitations are feelings that your heart races, jumps, throbs, or flutters  You may feel extra beats, no beats for a short time, or skipped beats  You may have these feelings in your chest, throat, or neck  They may happen when you are sitting, standing, or lying  Heart palpitations may be frightening, but are usually not caused by a serious problem  DISCHARGE INSTRUCTIONS:   Call 911 or have someone else call for any of the following:   · You have any of the following signs of a heart attack:      ¨ Squeezing, pressure, or pain in your chest that lasts longer than 5 minutes or returns    ¨ Discomfort or pain in your back, neck, jaw, stomach, or arm     ¨ Trouble breathing    ¨ Nausea or vomiting    ¨ Lightheadedness or a sudden cold sweat, especially with chest pain or trouble breathing    · You have any of the following signs of a stroke:      ¨ Numbness or drooping on one side of your face     ¨ Weakness in an arm or leg    ¨ Confusion or difficulty speaking    ¨ Dizziness, a severe headache, or vision loss    · You faint or lose consciousness  Return to the emergency department if:   · Your palpitations happen more often or get more intense  Contact your healthcare provider if:   · You have new or worsening swelling in your feet or ankles  · You have questions or concerns about your condition or care  Follow up with your healthcare provider as directed: You may need to follow up with a cardiologist  Letitia Shaw may need tests to check for heart problems that cause palpitations  Write down your questions so you remember to ask them during your visits  Keep a record:  Write down when your palpitations start and stop, what you were doing when they started, and your symptoms  Keep track of what you ate or drank within a few hours of your palpitations  Include anything that seemed to help your symptoms, such as lying down or holding your breath   This record will help you and your healthcare provider learn what triggers your palpitations  Bring this record with you to your follow up visits  Help prevent heart palpitations:   · Manage stress and anxiety  Find ways to relax such as listening to music, meditating, or doing yoga  Exercise can also help decrease stress and anxiety  Talk to someone you trust about your stress or anxiety  You can also talk to a therapist      · Get plenty of sleep every night  Ask your healthcare provider how much sleep you need each night  · Do not drink caffeine or alcohol  Caffeine and alcohol can make your palpitations worse  Caffeine is found in soda, coffee, tea, chocolate, and drinks that increase your energy  · Do not smoke  Nicotine and other chemicals in cigarettes and cigars may damage your heart and blood vessels  Ask your healthcare provider for information if you currently smoke and need help to quit  E-cigarettes or smokeless tobacco still contain nicotine  Talk to your healthcare provider before you use these products  · Do not use illegal drugs  Talk to your healthcare provider if you use illegal drugs and want help to quit  © 2017 [facility] Information is for End User's use only and may not be sold, redistributed or otherwise used for commercial purposes  All illustrations and images included in CareNotes® are the copyrighted property of [employer]  or [employer]  The above information is an  only  It is not intended as medical advice for individual conditions or treatments  Talk to your doctor, nurse or pharmacist before following any medical regimen to see if it is safe and effective for you  Deep Venous Thrombosis   WHAT YOU NEED TO KNOW:   Deep venous thrombosis (DVT) is a blood clot that forms in a deep vein of the body  The deep veins in the legs, thighs, and hips are the most common sites for DVT   DVT can also occur in a deep vein within your arms  The clot prevents the normal flow of blood in the vein  The blood backs up and causes pain and swelling  The DVT can break into smaller pieces and travel to your lungs and cause a blockage called a pulmonary embolism  A pulmonary embolism can become life-threatening  DISCHARGE INSTRUCTIONS:   Call 911 for any of the following:   · You feel lightheaded, short of breath, and have chest pain  · You cough up blood  Return to the emergency department if:   · Your symptoms get worse  · You develop new DVT symptoms in another leg or arm  Contact your healthcare provider if:   · Your gums or nose bleed  · You see blood in your urine or bowel movements  · Your bowel movements are black or darker than normal     · You have questions or concerns about your conditions or care  Medicines:   · Blood thinners  help prevent the DVT from getting bigger and prevent new clots from forming  Examples of blood thinners include heparin, rivaroxaban, apixiban, and warfarin  The following are general safety guidelines to follow while you are taking a blood thinner:     ¨ Watch for bleeding and bruising  Watch for bleeding from your gums or nose  Watch for blood in your urine and bowel movements  Use a soft washcloth on your skin, and a soft toothbrush to brush your teeth  This can keep your skin and gums from bleeding  If you shave, use an electric shaver  Do not play contact sports  ¨ Tell your dentist and other healthcare providers that you take a blood thinner  Wear a bracelet or necklace that says you take this medicine  ¨ Do not start or stop any medicines unless your healthcare provider tells you to  Many medicines cannot be used with blood thinners  ¨ Tell your healthcare provider right away if you forget to take the blood thinner , or if you take too much  ¨ Warfarin  is a blood thinner that you may need to take   The following are additional things you should be aware of if you take warfarin:    § Foods and medicines can affect the amount of warfarin in your blood  Do not make major changes to your diet  Warfarin works best when you eat about the same amount of vitamin K every day  Vitamin K is found in green leafy vegetables and certain other foods  Ask for more information about what to eat or not to eat  § You will need to see your healthcare provider for follow-up visits  You will need regular blood tests to decide how much warfarin you need  · Take your medicine as directed  Contact your healthcare provider if you think your medicine is not helping or if you have side effects  Tell him or her if you are allergic to any medicine  Keep a list of the medicines, vitamins, and herbs you take  Include the amounts, and when and why you take them  Bring the list or the pill bottles to follow-up visits  Carry your medicine list with you in case of an emergency  Manage your DVT:   · Wear pressure stockings  The stockings are tight and put pressure on your legs  This improves blood flow and helps prevent clots  Wear the stockings during the day  Do not wear them when you sleep  · Elevate your legs  above the level of your heart as often as you can  This will help decrease swelling and pain  Prop your legs on pillows or blankets to keep them elevated comfortably  · Exercise regularly  to help increase your blood flow  Walking is a good low-impact exercise  Talk to your healthcare provider about the best exercise plan for you  · Change body positions often  If you travel by car or work at a desk, move and stretch in your seat several times each hour  In an airplane, get up and walk every hour  If you are bedridden, ask for help to change your position every 1 to 2 hours  · Maintain a healthy weight  Ask your healthcare provider how much you should weigh  Ask him to help you create a weight loss plan if you are overweight  · Do not smoke    Nicotine and other chemicals in cigarettes and cigars can damage blood vessels and make it more difficult to manage your DVT  Ask your healthcare provider for information if you currently smoke and need help to quit  E-cigarettes or smokeless tobacco still contain nicotine  Talk to your healthcare provider before you use these products  Follow up with your healthcare provider as directed:  Write down your questions so you remember to ask them during your visits  © 2017 [address] [employer] Information is for End User's use only and may not be sold, redistributed or otherwise used for commercial purposes  All illustrations and images included in CareNotes® are the copyrighted property of [employer]  or Nico Buck  The above information is an  only  It is not intended as medical advice for individual conditions or treatments  Talk to your doctor, nurse or pharmacist before following any medical regimen to see if it is safe and effective for you

## 2018-06-25 NOTE — ED CARE HANDOFF
Emergency Department Sign Out Note        Sign out and transfer of care from **Dr Vina Fleischer*  See Separate Emergency Department note  The patient, Niurka Sultana, was evaluated by the previous provider for *follow up DVT**  Workup Completed:  *labs*    ED Course / Workup Pending (followup):  *chest xray and UA due to low grade temp found at triage**                             Procedures  MDM  Number of Diagnoses or Management Options  Diagnosis management comments: Georgina Padron to see pt  She is not having any infectious symptoms, no urinary symptoms, no fever/chills at home  I don't feel she needs abx  She was under the impression she was here to be checked for palpitations which Dr Vina Fleischer didn't mention to me  I reviewed her EKG and it is normal   Her electrolytes are normal   Will discharge, continue blood thinner and follow up outpt  Cardiology for possible holter monitor  Advised manage stress/anxiety, avoid all caffeine in the meantime  CritCare Time      Disposition  Final diagnoses:   Palpitations   DVT (deep venous thrombosis) ([place] [address] )     Time reflects when diagnosis was documented in both MDM as applicable and the Disposition within this note     Time User Action Codes Description Comment    1/71/2671  5:38 PM Lola Jacqui Add [V21 69,  I82 409] DVT (deep vein thrombosis) in pregnancy ([place] [address] )     5/30/6156  2:99 PM Lola Jacqui Add [S37 3] Palpitations     8/19/5937  5:93 PM Lola Jacqui Modify [R00 2] Palpitations     3/03/9905  5:30 PM Lola Jacqui Remove [O22 30,  I82 409] DVT (deep vein thrombosis) in pregnancy ([place] [address] )     6/76/6576  1:30 PM Lola Jacqui Add [P21 812] DVT (deep venous thrombosis) [facility])       ED Disposition     ED Disposition Condition Comment    Discharge  Niurka Sultana discharge to home/self care      Condition at discharge: Stable        Follow-up Information     Follow up With Specialties Details Why Viki Jones 94, DO Cardiology Schedule an appointment as soon as possible for a visit  [facility]  [address] [place] [address] [address]  [phone]          Patient's Medications   Discharge Prescriptions    No medications on file     No discharge procedures on file         ED Provider  Electronically Signed by     German Sanchez MD  24/37/31 0355

## 2018-06-25 NOTE — ED NOTES
Pt states she was diagnosed a month ago with left groin DVT & PE  Denies SOB at this time  C/o left medial pain radiating from groin down leg  States had blood work done 2-3 weeks ago       Cady Rosen RN  06/25/18 0898

## 2018-06-26 ENCOUNTER — TELEPHONE (OUTPATIENT)
Dept: ADMINISTRATIVE | Facility: OTHER | Age: 23
End: 2018-06-26

## 2018-06-26 NOTE — TELEPHONE ENCOUNTER
SPOKE WITH PATIENT AND REMINDED HER TO CALL DR Nola Gunter TO SCHEDULE AN APPT  FOR  ED F//U - PATIENT STATED THAT SHE WOULD CALL TO SCHEDULE

## 2018-06-29 LAB
ATRIAL RATE: 72 BPM
P AXIS: 71 DEGREES
PR INTERVAL: 138 MS
QRS AXIS: 75 DEGREES
QRSD INTERVAL: 84 MS
QT INTERVAL: 394 MS
QTC INTERVAL: 431 MS
T WAVE AXIS: 59 DEGREES
VENTRICULAR RATE: 72 BPM

## 2018-06-29 PROCEDURE — 93010 ELECTROCARDIOGRAM REPORT: CPT | Performed by: INTERNAL MEDICINE

## 2018-07-05 ENCOUNTER — OFFICE VISIT (OUTPATIENT)
Dept: CARDIOLOGY CLINIC | Facility: CLINIC | Age: 23
End: 2018-07-05
Payer: COMMERCIAL

## 2018-07-05 VITALS
DIASTOLIC BLOOD PRESSURE: 80 MMHG | HEART RATE: 87 BPM | BODY MASS INDEX: 20.93 KG/M2 | OXYGEN SATURATION: 98 % | WEIGHT: 125.8 LBS | SYSTOLIC BLOOD PRESSURE: 120 MMHG

## 2018-07-05 DIAGNOSIS — I26.99 OTHER ACUTE PULMONARY EMBOLISM WITHOUT ACUTE COR PULMONALE (HCC): ICD-10-CM

## 2018-07-05 DIAGNOSIS — R00.2 PALPITATIONS: Primary | ICD-10-CM

## 2018-07-05 DIAGNOSIS — I82.412 ACUTE DEEP VEIN THROMBOSIS (DVT) OF FEMORAL VEIN OF LEFT LOWER EXTREMITY (HCC): ICD-10-CM

## 2018-07-05 PROCEDURE — 99203 OFFICE O/P NEW LOW 30 MIN: CPT | Performed by: INTERNAL MEDICINE

## 2018-07-05 NOTE — PROGRESS NOTES
Subjective:        Patient ID: Juan Vasquez is a 26 y o  female  Chief Complaint: Palpitations  Miss Sean Juarez is a 26year old female here for evaluation of palpitations  This is a new problem that has been present for the past 2 weeks  Palpitations occur daily and have been unchanged since onset  She describes them as a flip in her chest that lasts for a second  Can happen multiple times over a few minutes  She has them at night and during exertion  No aggravating factors  Has not tried any treatments  Recently diagnosed with DVT and bilateral PE  Workup in hospital included a normal echocardiogram        The following portions of the patient's history were reviewed and updated as appropriate:   She  has a past medical history of Depression with anxiety (01/07/2012); [facility] ); Headache (01/07/2012); Pulmonary embolism ([facility] ); Raynaud disease; and TMJ arthralgia  She  has a past surgical history that includes No past surgeries  Her family history includes Cancer in her maternal grandmother; Hypertension in her maternal grandmother; No Known Problems in her father and mother  She  reports that she has never smoked  She has never used smokeless tobacco  She reports that she drinks alcohol  She reports that she does not use drugs  Current Outpatient Prescriptions   Medication Sig Dispense Refill    Acetaminophen (TYLENOL) 325 MG CAPS Take by mouth      apixaban (ELIQUIS) 5 mg Take 1 tablet (5 mg total) by mouth 2 (two) times a day 60 tablet 5    JUNEL FE 1/20 1-20 MG-MCG per tablet   1     No current facility-administered medications for this visit  She has No Known Allergies  Review of Systems   Constitution: Negative for chills, fever, weakness, malaise/fatigue, weight gain and weight loss  HENT: Negative for congestion and nosebleeds  Cardiovascular: Positive for irregular heartbeat, leg swelling and palpitations   Negative for chest pain, claudication, dyspnea on exertion, near-syncope, orthopnea, paroxysmal nocturnal dyspnea and syncope  Respiratory: Negative for cough, shortness of breath and wheezing  Endocrine: Negative for polydipsia, polyphagia and polyuria  Hematologic/Lymphatic: Negative for bleeding problem  Does not bruise/bleed easily  Skin: Negative for itching, rash and suspicious lesions  Musculoskeletal: Negative for joint pain, muscle weakness and myalgias  Gastrointestinal: Negative for abdominal pain, heartburn, nausea and vomiting  Neurological: Negative for focal weakness, headaches and light-headedness  Objective:     /80 (BP Location: Right arm, Patient Position: Sitting, Cuff Size: Standard)   Pulse 87   Wt 57 1 kg (125 lb 12 8 oz)   LMP 06/18/2018   SpO2 98%   BMI 20 93 kg/m²    Physical Exam   Constitutional: She is oriented to person, place, and time  She appears well-developed and well-nourished  No distress  HENT:   Head: Normocephalic and atraumatic  Eyes: Conjunctivae and EOM are normal  Pupils are equal, round, and reactive to light  Neck: Normal range of motion  Neck supple  No thyromegaly present  Cardiovascular: Normal rate, regular rhythm and normal heart sounds  Exam reveals no gallop, no distant heart sounds, no midsystolic click and no opening snap  No murmur heard  Pulmonary/Chest: Effort normal and breath sounds normal  No respiratory distress  She has no wheezes  She has no rales  She exhibits no tenderness  Abdominal: Soft  Bowel sounds are normal  She exhibits no distension  There is no tenderness  Musculoskeletal: She exhibits edema  She exhibits no tenderness or deformity  Neurological: She is alert and oriented to person, place, and time  Skin: Skin is warm and dry  She is not diaphoretic  No erythema  Psychiatric: She has a normal mood and affect   Her behavior is normal  Judgment and thought content normal      Lab Review:   Lab Results   Component Value Date  (L) 06/25/2018    K 3 7 06/25/2018     06/25/2018    CO2 27 06/25/2018    BUN 10 06/25/2018    CREATININE 0 75 06/25/2018    GLUCOSE 94 06/25/2018    CALCIUM 9 3 06/25/2018     No results found for: CKTOTAL, CKMB, CKMBINDEX, TROPONINI  Lab Results   Component Value Date    WBC 11 35 (H) 06/25/2018    HGB 13 4 06/25/2018    HCT 39 7 06/25/2018    MCV 92 06/25/2018     06/25/2018         Assessment:       1  Palpitations  Holter monitor - 24 hour   2  Other acute pulmonary embolism without acute cor pulmonale ([facility] )     3  Acute deep vein thrombosis (DVT) of femoral vein of left lower extremity (HCC)          Plan:       - discussed likely benign etiology of palpitations  - will obtain holter monitor for further evaluation  - monitor for any triggering factors  - compression stockings  - followup with Dr Yadi Harrison for hypercoagulable workup  - Continue Eliquis

## 2018-07-05 NOTE — LETTER
July 5, 2018     Roberto Lucio, [address] [facility]    Patient: Jhony Shankar   YOB: 1995   Date of Visit: 7/5/2018       Dear Dr Hubbard Ip: Thank you for referring José Torres to me for evaluation  Below are my notes for this consultation  If you have questions, please do not hesitate to call me  I look forward to following your patient along with you  Sincerely,        Lynn Earl DO        CC: No Recipients  Jose Ortega DO  7/5/2018  4:32 PM  Sign at close encounter  Subjective:        Patient ID: Jhony Shankar is a 26 y o  female  Chief Complaint: Palpitations  Miss Queta Mendoza is a 26year old female here for evaluation of palpitations  This is a new problem that has been present for the past 2 weeks  Palpitations occur daily and have been unchanged since onset  She describes them as a flip in her chest that lasts for a second  Can happen multiple times over a few minutes  She has them at night and during exertion  No aggravating factors  Has not tried any treatments  Recently diagnosed with DVT and bilateral PE  Workup in hospital included a normal echocardiogram        The following portions of the patient's history were reviewed and updated as appropriate:   She  has a past medical history of Depression with anxiety (01/07/2012); DVT (deep venous thrombosis) ([facility] ); Headache (01/07/2012); Pulmonary embolism ([facility] ); Raynaud disease; and TMJ arthralgia  She  has a past surgical history that includes No past surgeries  Her family history includes Cancer in her maternal grandmother; Hypertension in her maternal grandmother; No Known Problems in her father and mother  She  reports that she has never smoked  She has never used smokeless tobacco  She reports that she drinks alcohol  She reports that she does not use drugs    Current Outpatient Prescriptions   Medication Sig Dispense Refill    Acetaminophen (TYLENOL) 325 MG CAPS Take by mouth      apixaban (ELIQUIS) 5 mg Take 1 tablet (5 mg total) by mouth 2 (two) times a day 60 tablet 5    JUNEL FE 1/20 1-20 MG-MCG per tablet   1     No current facility-administered medications for this visit  She has No Known Allergies  Review of Systems   Constitution: Negative for chills, fever, weakness, malaise/fatigue, weight gain and weight loss  HENT: Negative for congestion and nosebleeds  Cardiovascular: Positive for irregular heartbeat, leg swelling and palpitations  Negative for chest pain, claudication, dyspnea on exertion, near-syncope, orthopnea, paroxysmal nocturnal dyspnea and syncope  Respiratory: Negative for cough, shortness of breath and wheezing  Endocrine: Negative for polydipsia, polyphagia and polyuria  Hematologic/Lymphatic: Negative for bleeding problem  Does not bruise/bleed easily  Skin: Negative for itching, rash and suspicious lesions  Musculoskeletal: Negative for joint pain, muscle weakness and myalgias  Gastrointestinal: Negative for abdominal pain, heartburn, nausea and vomiting  Neurological: Negative for focal weakness, headaches and light-headedness  Objective:     /80 (BP Location: Right arm, Patient Position: Sitting, Cuff Size: Standard)   Pulse 87   Wt 57 1 kg (125 lb 12 8 oz)   LMP 06/18/2018   SpO2 98%   BMI 20 93 kg/m²     Physical Exam   Constitutional: She is oriented to person, place, and time  She appears well-developed and well-nourished  No distress  HENT:   Head: Normocephalic and atraumatic  Eyes: Conjunctivae and EOM are normal  Pupils are equal, round, and reactive to light  Neck: Normal range of motion  Neck supple  No thyromegaly present  Cardiovascular: Normal rate, regular rhythm and normal heart sounds  Exam reveals no gallop, no distant heart sounds, no midsystolic click and no opening snap  No murmur heard    Pulmonary/Chest: Effort normal and breath sounds normal  No respiratory distress  She has no wheezes  She has no rales  She exhibits no tenderness  Abdominal: Soft  Bowel sounds are normal  She exhibits no distension  There is no tenderness  Musculoskeletal: She exhibits edema  She exhibits no tenderness or deformity  Neurological: She is alert and oriented to person, place, and time  Skin: Skin is warm and dry  She is not diaphoretic  No erythema  Psychiatric: She has a normal mood and affect  Her behavior is normal  Judgment and thought content normal      Lab Review:   Lab Results   Component Value Date     (L) 06/25/2018    K 3 7 06/25/2018     06/25/2018    CO2 27 06/25/2018    BUN 10 06/25/2018    CREATININE 0 75 06/25/2018    GLUCOSE 94 06/25/2018    CALCIUM 9 3 06/25/2018     No results found for: CKTOTAL, CKMB, CKMBINDEX, TROPONINI  Lab Results   Component Value Date    WBC 11 35 (H) 06/25/2018    HGB 13 4 06/25/2018    HCT 39 7 06/25/2018    MCV 92 06/25/2018     06/25/2018         Assessment:       1  Palpitations  Holter monitor - 24 hour   2  Other acute pulmonary embolism without acute cor pulmonale ([address] )     3  Acute deep vein thrombosis (DVT) of femoral vein of left lower extremity (HCC)          Plan:       - discussed likely benign etiology of palpitations  - will obtain holter monitor for further evaluation  - monitor for any triggering factors  - compression stockings  - followup with Dr Sundeep London for hypercoagulable workup  - Continue Eliquis

## 2018-07-09 ENCOUNTER — OFFICE VISIT (OUTPATIENT)
Dept: FAMILY MEDICINE CLINIC | Facility: CLINIC | Age: 23
End: 2018-07-09
Payer: COMMERCIAL

## 2018-07-09 VITALS
SYSTOLIC BLOOD PRESSURE: 120 MMHG | HEART RATE: 84 BPM | DIASTOLIC BLOOD PRESSURE: 80 MMHG | WEIGHT: 127 LBS | HEIGHT: 65 IN | BODY MASS INDEX: 21.16 KG/M2 | TEMPERATURE: 98.1 F

## 2018-07-09 DIAGNOSIS — I26.99 OTHER ACUTE PULMONARY EMBOLISM WITHOUT ACUTE COR PULMONALE (HCC): ICD-10-CM

## 2018-07-09 DIAGNOSIS — I82.412 ACUTE DEEP VEIN THROMBOSIS (DVT) OF FEMORAL VEIN OF LEFT LOWER EXTREMITY (HCC): Primary | ICD-10-CM

## 2018-07-09 PROBLEM — R10.9 ABDOMINAL PAIN: Status: RESOLVED | Noted: 2018-05-16 | Resolved: 2018-07-09

## 2018-07-09 PROBLEM — R07.81 PLEURAL PAIN: Status: RESOLVED | Noted: 2018-06-09 | Resolved: 2018-07-09

## 2018-07-09 PROBLEM — R65.10 SIRS (SYSTEMIC INFLAMMATORY RESPONSE SYNDROME) (HCC): Status: RESOLVED | Noted: 2018-05-16 | Resolved: 2018-07-09

## 2018-07-09 PROBLEM — R42 DIZZINESS: Status: RESOLVED | Noted: 2018-05-16 | Resolved: 2018-07-09

## 2018-07-09 PROBLEM — E87.1 HYPONATREMIA: Status: RESOLVED | Noted: 2018-05-16 | Resolved: 2018-07-09

## 2018-07-09 PROCEDURE — 99213 OFFICE O/P EST LOW 20 MIN: CPT | Performed by: FAMILY MEDICINE

## 2018-07-09 NOTE — PROGRESS NOTES
Chief Complaint   Patient presents with    Follow-up        Patient ID: Arcelia Perez is a 26 y o  female  HPI  Pt is seeing for f/u DVT and b/l PE 7 wks ago -  Cont with SOB with exertion and palpitations but slow improvement noticed - less L leg swelling  -  Was seeing by cardiologist, pulmonologist, hematologist     The following portions of the patient's history were reviewed and updated as appropriate: allergies, current medications, past family history, past medical history, past social history, past surgical history and problem list     Review of Systems   Constitutional: Negative  Respiratory: Positive for shortness of breath  Negative for cough, chest tightness, wheezing and stridor  Cardiovascular: Positive for palpitations  Negative for chest pain and leg swelling  Gastrointestinal: Negative  Current Outpatient Prescriptions   Medication Sig Dispense Refill    Acetaminophen (TYLENOL) 325 MG CAPS Take by mouth      apixaban (ELIQUIS) 5 mg Take 1 tablet (5 mg total) by mouth 2 (two) times a day 60 tablet 5     No current facility-administered medications for this visit  Objective:    /80 (BP Location: Left arm, Patient Position: Sitting, Cuff Size: Standard)   Pulse 84   Temp 98 1 °F (36 7 °C) (Tympanic)   Ht 5' 5" (1 651 m)   Wt 57 6 kg (127 lb)   LMP 06/15/2018   BMI 21 13 kg/m²        Physical Exam   Cardiovascular: Normal rate, regular rhythm and normal heart sounds  No murmur heard  Pulmonary/Chest: Effort normal and breath sounds normal  No respiratory distress  She has no wheezes  She has no rales  Neurological: No cranial nerve deficit               Assessment/Plan:         Diagnoses and all orders for this visit:    Acute deep vein thrombosis (DVT) of femoral vein of left lower extremity ([address] )    Other acute pulmonary embolism without acute cor pulmonale (HCC)        Cont Eliquis     rto in 1 m                    Samantha Herrmann MD

## 2018-07-09 NOTE — LETTER
July 9, 2018     Patient: Abdullahi Tapia   YOB: 1995   Date of Visit: 7/9/2018       To Whom it May Concern:    Cassandra Pollard is under my professional care for DVT and Pulmonary embolism -  First Dx on 5/16/18  She was seen in my office on 7/9/2018  She may return to work on 8/20/2018  Patient is unable to work at the moment  If you have any questions or concerns, please don't hesitate to call           Sincerely,          Seven Momin MD        CC: No Recipients

## 2018-07-18 ENCOUNTER — OFFICE VISIT (OUTPATIENT)
Dept: HEMATOLOGY ONCOLOGY | Facility: MEDICAL CENTER | Age: 23
End: 2018-07-18
Payer: COMMERCIAL

## 2018-07-18 VITALS
HEIGHT: 65 IN | OXYGEN SATURATION: 93 % | BODY MASS INDEX: 21.83 KG/M2 | TEMPERATURE: 99 F | HEART RATE: 67 BPM | RESPIRATION RATE: 16 BRPM | WEIGHT: 131 LBS | SYSTOLIC BLOOD PRESSURE: 110 MMHG | DIASTOLIC BLOOD PRESSURE: 70 MMHG

## 2018-07-18 DIAGNOSIS — N92.0 MENORRHAGIA WITH REGULAR CYCLE: ICD-10-CM

## 2018-07-18 DIAGNOSIS — R06.02 SOB (SHORTNESS OF BREATH) ON EXERTION: ICD-10-CM

## 2018-07-18 DIAGNOSIS — I26.99 OTHER ACUTE PULMONARY EMBOLISM WITHOUT ACUTE COR PULMONALE (HCC): ICD-10-CM

## 2018-07-18 DIAGNOSIS — I82.412 ACUTE DEEP VEIN THROMBOSIS (DVT) OF FEMORAL VEIN OF LEFT LOWER EXTREMITY (HCC): Primary | ICD-10-CM

## 2018-07-18 DIAGNOSIS — R60.0 LEG EDEMA, LEFT: ICD-10-CM

## 2018-07-18 PROCEDURE — 99214 OFFICE O/P EST MOD 30 MIN: CPT | Performed by: PHYSICIAN ASSISTANT

## 2018-07-18 NOTE — PROGRESS NOTES
Hematology/Oncology Outpatient Follow-up  Madhuri Sanon 26 y o  female 1995 [id number]    Date:  7/18/2018      Assessment and Plan:  1  Acute deep vein thrombosis (DVT) of femoral vein of left lower extremity ([facility] [address] ), 2  Other acute pulmonary embolism without acute cor pulmonale ([facility] [address] )  Patient was diagnosed with DVT and PE in May 2018  She has remained on Eliquis 5 mg b i d  She has not missed any doses  She states that she has imaging studies next week per pulmonology  Hemophilia workup ordered today  She will have comprehensive panel with [employer] or  [facility] depending on her insurance  She will review this with Dr Mateus Todd at her next visit  She will continue on therapeutic anticoagulation for at least 6 months  She is aware that if she were to become pregnant, she would need to contact our office immediately  She would then need to be switched to Lovenox for therapeutic anticoagulation  Advised other forms of contraception to prevent pregnancy at this time  3  SOB (shortness of breath) on exertion    Shortness of breath  Has only been present when exposed to  humid conditions  She had shortness of breath episode when climbing the stairs this past week when very humid outside  When she is in the air conditioning she does not have difficulty  I rest she does not shortness of breath  Discussed with patient that this should  Continue to improve  She does note improvement compared to a few weeks prior  She is aware to contact us if she has worsening shortness of breath especially if at rest     4  Leg edema, left    Patient had left leg edema secondary to DVT  She got a compression stocking that goes from her foot to mid thigh area  She has been very compliant with this and wearing throughout the day and removing at night time  On physical exam leg does not appear to significantly edematous  No pitting edema  Advised to continue use of compression stocking at this time      5  Menorrhagia with regular cycle    Patient was previously on oral  Contraception  This was discontinued with diagnosis of pulmonary embolism and DVT  She was seen by her GYN today  They discussed alternative forms of birth control  Patient was interested in an IUD  I discussed that a copper IUD, non-hormonal, would be appropriate in her case  I will be conferring with her GYN regarding this  She is taking oral iron daily  She is tolerating this well  She does not have constipation, gastric upset  HPI:   60-year-old female presented to the emergency department in May 2018 with dizziness, SOB, abdominal pain  She was found to have a large burden right lower lobe and right middle lobe pulmonary embolism, as well as small scattered left-sided pulmonary embolism  Venous Doppler studies of the bilateral lower extremities showed acute/subacute nonocclusive DVT in the external iliac vein, common femoral vein and the femoral vein of the left lower extremity  Right lower extremity was negative for DVT  Interval history:  [place] [facility]- [facility]; she was seen today by them to talk about birth control [phone]  Last period 7/11/18   Noticed more frequent episodes of Raynaud's since starting Eliquis         ROS: Review of Systems   Constitutional: Negative for appetite change, chills, fatigue, fever and unexpected weight change  HENT: Negative for mouth sores and nosebleeds  Respiratory: Positive for shortness of breath (With exertion, specifically in  humidity)  Negative for cough, chest tightness and wheezing  Cardiovascular: Positive for leg swelling (improved )  Negative for chest pain and palpitations  Gastrointestinal: Negative for abdominal pain, blood in stool, constipation, diarrhea, nausea and vomiting  Genitourinary: Negative for difficulty urinating, dysuria and hematuria  Musculoskeletal: Negative for arthralgias, joint swelling and myalgias     Skin: Negative  Neurological: Negative for dizziness, weakness, light-headedness, numbness and headaches  Hematological: Negative  Psychiatric/Behavioral: Negative  Past Medical History:   Diagnosis Date    Depression with anxiety 01/07/2012    DVT (deep venous thrombosis) ([facility] [address] )     Headache 01/07/2012    Pulmonary embolism (HCC)     Raynaud disease     TMJ arthralgia     last assessed 04/29/2015       Past Surgical History:   Procedure Laterality Date    NO PAST SURGERIES         Social History     Social History    Marital status: Single     Spouse name: N/A    Number of children: N/A    Years of education: N/A     Social History Main Topics    Smoking status: Never Smoker    Smokeless tobacco: Never Used    Alcohol use Yes      Comment: socially    Drug use: No    Sexual activity: Yes      Comment: birth control pills      Other Topics Concern    Not on file     Social History Narrative    No narrative on file       Family History   Problem Relation Age of Onset    No Known Problems Mother     No Known Problems Father     Cancer Maternal Grandmother     Hypertension Maternal Grandmother        No Known Allergies      Current Outpatient Prescriptions:     Acetaminophen (TYLENOL) 325 MG CAPS, Take by mouth, Disp: , Rfl:     apixaban (ELIQUIS) 5 mg, Take 1 tablet (5 mg total) by mouth 2 (two) times a day, Disp: 60 tablet, Rfl: 5      Physical Exam:  /70 (BP Location: Right arm, Patient Position: Sitting, Cuff Size: Standard)   Pulse 67   Temp 99 °F (37 2 °C) (Tympanic)   Resp 16   Ht 5' 5" (1 651 m)   Wt 59 4 kg (131 lb)   LMP 06/18/2018   SpO2 93%   BMI 21 80 kg/m²     Physical Exam   Constitutional: She is oriented to person, place, and time  She appears well-developed and well-nourished  No distress  HENT:   Head: Normocephalic and atraumatic  Eyes: Conjunctivae are normal  No scleral icterus  Neck: Normal range of motion  Neck supple     Cardiovascular: Normal rate, regular rhythm and normal heart sounds  No murmur heard  Pulmonary/Chest: Effort normal and breath sounds normal  No respiratory distress  Abdominal: Soft  There is no tenderness  Musculoskeletal: Normal range of motion  She exhibits no edema or tenderness  Compression stocking on LLE   Lymphadenopathy:     She has no cervical adenopathy  Neurological: She is alert and oriented to person, place, and time  No cranial nerve deficit  Skin: Skin is warm and dry  Psychiatric: She has a normal mood and affect  Vitals reviewed  Labs:  Lab Results   Component Value Date    WBC 11 35 (H) 06/25/2018    HGB 13 4 06/25/2018    HCT 39 7 06/25/2018    MCV 92 06/25/2018     06/25/2018     Lab Results   Component Value Date     (L) 06/25/2018    K 3 7 06/25/2018     06/25/2018    CO2 27 06/25/2018    ANIONGAP 6 06/25/2018    BUN 10 06/25/2018    CREATININE 0 75 06/25/2018    GLUCOSE 94 06/25/2018    CALCIUM 9 3 06/25/2018    AST 15 06/25/2018    ALT 24 06/25/2018    ALKPHOS 81 06/25/2018    PROT 7 4 06/25/2018    BILITOT 0 50 06/25/2018    EGFR 114 06/25/2018       Patient voiced understanding and agreement in the above discussion  Aware to contact our office with questions/symptoms in the interim

## 2018-07-23 ENCOUNTER — TELEPHONE (OUTPATIENT)
Dept: FAMILY MEDICINE CLINIC | Facility: CLINIC | Age: 23
End: 2018-07-23

## 2018-07-24 NOTE — TELEPHONE ENCOUNTER
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

## 2018-07-27 ENCOUNTER — HOSPITAL ENCOUNTER (OUTPATIENT)
Dept: NON INVASIVE DIAGNOSTICS | Facility: HOSPITAL | Age: 23
Discharge: HOME | End: 2018-07-27
Attending: INTERNAL MEDICINE
Payer: COMMERCIAL

## 2018-07-27 DIAGNOSIS — R00.2 PALPITATIONS: ICD-10-CM

## 2018-07-27 PROCEDURE — 93225 XTRNL ECG REC<48 HRS REC: CPT

## 2018-07-27 PROCEDURE — 93226 XTRNL ECG REC<48 HR SCAN A/R: CPT

## 2018-07-30 ENCOUNTER — HOSPITAL ENCOUNTER (OUTPATIENT)
Dept: RADIOLOGY | Facility: HOSPITAL | Age: 23
Discharge: HOME | End: 2018-07-30
Attending: INTERNAL MEDICINE
Payer: COMMERCIAL

## 2018-07-30 ENCOUNTER — TRANSCRIBE ORDERS (OUTPATIENT)
Dept: ADMINISTRATIVE | Facility: HOSPITAL | Age: 23
End: 2018-07-30

## 2018-07-30 ENCOUNTER — TELEPHONE (OUTPATIENT)
Dept: HEMATOLOGY ONCOLOGY | Facility: MEDICAL CENTER | Age: 23
End: 2018-07-30

## 2018-07-30 DIAGNOSIS — I26.99 BILATERAL PULMONARY EMBOLISM (HCC): ICD-10-CM

## 2018-07-30 PROCEDURE — 71275 CT ANGIOGRAPHY CHEST: CPT

## 2018-07-30 RX ADMIN — IOHEXOL 80 ML: 350 INJECTION, SOLUTION INTRAVENOUS at 10:15

## 2018-07-30 NOTE — TELEPHONE ENCOUNTER
Per Timothy Holland at [facility] patient must use [facility] for the testing  She will require authorization  I will start the process tomorrow  Patient aware

## 2018-08-06 PROCEDURE — 93227 XTRNL ECG REC<48 HR R&I: CPT | Performed by: INTERNAL MEDICINE

## 2018-08-08 ENCOUNTER — TELEPHONE (OUTPATIENT)
Dept: CARDIOLOGY CLINIC | Facility: CLINIC | Age: 23
End: 2018-08-08

## 2018-08-08 NOTE — TELEPHONE ENCOUNTER
----- Message from Toma Escobar DO sent at 8/7/2018 11:19 AM EDT -----  Can you please let the patient know test was normal and they should follow up as we discussed

## 2018-08-10 ENCOUNTER — TRANSCRIBE ORDERS (OUTPATIENT)
Dept: HEMATOLOGY ONCOLOGY | Facility: MEDICAL CENTER | Age: 23
End: 2018-08-10

## 2018-08-10 ENCOUNTER — DOCUMENTATION (OUTPATIENT)
Dept: HEMATOLOGY ONCOLOGY | Facility: MEDICAL CENTER | Age: 23
End: 2018-08-10

## 2018-08-10 DIAGNOSIS — I82.419 DEEP VEIN THROMBOSIS (DVT) OF FEMORAL VEIN, UNSPECIFIED CHRONICITY, UNSPECIFIED LATERALITY (HCC): ICD-10-CM

## 2018-08-10 DIAGNOSIS — I26.99 OTHER PULMONARY EMBOLISM WITHOUT ACUTE COR PULMONALE, UNSPECIFIED CHRONICITY (HCC): Primary | ICD-10-CM

## 2018-08-10 NOTE — PROGRESS NOTES
Per Bibiana Iyer at [facility] Thrombotic Profile has been approved to be done @ [facility] # [id number] valid until October 31,2018  Call ref # [id number]   Patient aware and script faxed to Ste[facility] on [address] [facility]

## 2018-08-13 ENCOUNTER — OFFICE VISIT (OUTPATIENT)
Dept: FAMILY MEDICINE CLINIC | Facility: CLINIC | Age: 23
End: 2018-08-13
Payer: COMMERCIAL

## 2018-08-13 VITALS
TEMPERATURE: 98 F | SYSTOLIC BLOOD PRESSURE: 110 MMHG | HEART RATE: 80 BPM | RESPIRATION RATE: 16 BRPM | DIASTOLIC BLOOD PRESSURE: 70 MMHG | WEIGHT: 133 LBS | HEIGHT: 65 IN | BODY MASS INDEX: 22.16 KG/M2

## 2018-08-13 DIAGNOSIS — I82.412 ACUTE DEEP VEIN THROMBOSIS (DVT) OF FEMORAL VEIN OF LEFT LOWER EXTREMITY (HCC): ICD-10-CM

## 2018-08-13 DIAGNOSIS — I26.99 OTHER ACUTE PULMONARY EMBOLISM WITHOUT ACUTE COR PULMONALE (HCC): Primary | ICD-10-CM

## 2018-08-13 PROBLEM — D72.829 LEUKOCYTOSIS: Status: RESOLVED | Noted: 2018-05-16 | Resolved: 2018-08-13

## 2018-08-13 PROCEDURE — 99214 OFFICE O/P EST MOD 30 MIN: CPT | Performed by: FAMILY MEDICINE

## 2018-08-13 NOTE — PROGRESS NOTES
Chief Complaint   Patient presents with    Follow-up   b/l PE and L leg DVT     Patient ID: Reta Phelps is a 26 y o  female  HPI  Pt is seeing for f/u b/l PE and L leg DVT 3 m ago -  Feeling better with breathing and palpitations resolved -  Cont to have L leg swelling with prolong walking or standing   -  Repeat CTA lung 2 wls ago showed resolved PE  -  Pt was working as a  prior to this episode     The following portions of the patient's history were reviewed and updated as appropriate: allergies, current medications, past family history, past medical history, past social history, past surgical history and problem list     Review of Systems   Constitutional: Negative  Respiratory: Negative  Cardiovascular: Positive for leg swelling  Negative for chest pain and palpitations  Gastrointestinal: Negative  Genitourinary: Negative  Musculoskeletal: Negative  Skin: Negative  Neurological: Negative  Psychiatric/Behavioral: Negative  Current Outpatient Prescriptions   Medication Sig Dispense Refill    apixaban (ELIQUIS) 5 mg Take 1 tablet (5 mg total) by mouth 2 (two) times a day 60 tablet 5    Acetaminophen (TYLENOL) 325 MG CAPS Take by mouth       No current facility-administered medications for this visit  Objective:    /70 (BP Location: Left arm, Patient Position: Sitting, Cuff Size: Standard)   Pulse 80   Temp 98 °F (36 7 °C) (Tympanic)   Resp 16   Ht 5' 5" (1 651 m)   Wt 60 3 kg (133 lb)   BMI 22 13 kg/m²        Physical Exam   Cardiovascular: Normal rate and regular rhythm  Exam reveals no gallop  No murmur heard  Pulmonary/Chest: Effort normal and breath sounds normal  No respiratory distress  She has no wheezes  She has no rales  Musculoskeletal: She exhibits no edema, tenderness or deformity  Compression stocking up to high thigh level is on L leg    Skin: No rash noted  No pallor  Psychiatric: She has a normal mood and affect  Assessment/Plan:         Diagnoses and all orders for this visit:    Other acute pulmonary embolism without acute cor pulmonale (HCC)    Acute deep vein thrombosis (DVT) of femoral vein of left lower extremity (HCC)  -     VAS lower limb venous duplex study, unilateral/limited;  Future        Will cont with current disability for another month     rto in 1 m                     Katherine Zhu MD

## 2018-08-14 ENCOUNTER — OFFICE VISIT (OUTPATIENT)
Dept: FAMILY MEDICINE CLINIC | Facility: CLINIC | Age: 23
End: 2018-08-14
Payer: COMMERCIAL

## 2018-08-14 VITALS
WEIGHT: 133 LBS | HEIGHT: 65 IN | RESPIRATION RATE: 16 BRPM | TEMPERATURE: 98.1 F | BODY MASS INDEX: 22.16 KG/M2 | HEART RATE: 92 BPM | SYSTOLIC BLOOD PRESSURE: 120 MMHG | DIASTOLIC BLOOD PRESSURE: 80 MMHG

## 2018-08-14 DIAGNOSIS — Z02.89 ENCOUNTER FOR OCCUPATIONAL PHYSICAL EXAMINATION: Primary | ICD-10-CM

## 2018-08-14 DIAGNOSIS — Z00.00 WELL ADULT EXAM: Primary | ICD-10-CM

## 2018-08-14 PROCEDURE — 86480 TB TEST CELL IMMUN MEASURE: CPT | Performed by: PHYSICIAN ASSISTANT

## 2018-08-14 PROCEDURE — 99395 PREV VISIT EST AGE 18-39: CPT | Performed by: FAMILY MEDICINE

## 2018-08-14 NOTE — PROGRESS NOTES
Chief Complaint   Patient presents with    Physical Exam        Patient ID: Ramez Casillas is a 26 y o  female  HPI  Pt is seeing for CPE    The following portions of the patient's history were reviewed and updated as appropriate: allergies, current medications, past family history, past medical history, past social history, past surgical history and problem list     Review of Systems   Constitutional: Negative for fatigue, fever and unexpected weight change  HENT: Negative for congestion, ear discharge, ear pain, hearing loss, rhinorrhea, sinus pressure, sore throat and trouble swallowing  Eyes: Negative  Respiratory: Negative  Cardiovascular: Negative  Gastrointestinal: Negative  Endocrine: Negative  Genitourinary: Negative  Musculoskeletal: Negative  Skin: Negative  Neurological: Negative for dizziness, weakness, light-headedness and numbness  Hematological: Negative  Psychiatric/Behavioral: Negative  except for L leg swelling by the end of the day  -  PE/DVT 3 m ago, on Eliquis     Current Outpatient Prescriptions   Medication Sig Dispense Refill    Acetaminophen (TYLENOL) 325 MG CAPS Take by mouth      apixaban (ELIQUIS) 5 mg Take 1 tablet (5 mg total) by mouth 2 (two) times a day 60 tablet 5     No current facility-administered medications for this visit  Objective:    /80 (BP Location: Left arm, Patient Position: Sitting, Cuff Size: Standard)   Pulse 92   Temp 98 1 °F (36 7 °C) (Tympanic)   Resp 16   Ht 5' 4 5" (1 638 m)   Wt 60 3 kg (133 lb)   BMI 22 48 kg/m²        Physical Exam   Constitutional: She is oriented to person, place, and time  She appears well-developed and well-nourished  No distress  HENT:   Head: Normocephalic and atraumatic     Right Ear: Hearing, tympanic membrane, external ear and ear canal normal    Left Ear: Hearing, tympanic membrane, external ear and ear canal normal    Mouth/Throat: Oropharynx is clear and moist  Eyes: Conjunctivae and EOM are normal    Neck: Neck supple  No JVD present  No thyroid mass and no thyromegaly present  Cardiovascular: Regular rhythm and normal heart sounds  Exam reveals no gallop  No murmur heard  Pulmonary/Chest: Breath sounds normal  No respiratory distress  She has no wheezes  She has no rhonchi  She has no rales  Abdominal: Soft  Bowel sounds are normal  There is no tenderness  Musculoskeletal: She exhibits no edema, tenderness or deformity  Lymphadenopathy:     She has no cervical adenopathy  Neurological: She is alert and oriented to person, place, and time  She has normal strength  No cranial nerve deficit  Skin: Skin is warm and intact  No rash noted  No pallor  Psychiatric: She has a normal mood and affect  Her behavior is normal                Assessment/Plan:         Diagnoses and all orders for this visit:    Well adult exam  -     Hepatitis B surface antibody;  Future  -     Hepatitis B surface antibody        rto prn                     Daniella Mei MD

## 2018-08-16 ENCOUNTER — OFFICE VISIT (OUTPATIENT)
Dept: HEMATOLOGY ONCOLOGY | Facility: MEDICAL CENTER | Age: 23
End: 2018-08-16
Payer: COMMERCIAL

## 2018-08-16 VITALS
TEMPERATURE: 97.8 F | OXYGEN SATURATION: 98 % | SYSTOLIC BLOOD PRESSURE: 116 MMHG | WEIGHT: 131 LBS | BODY MASS INDEX: 21.83 KG/M2 | DIASTOLIC BLOOD PRESSURE: 68 MMHG | HEIGHT: 65 IN | RESPIRATION RATE: 16 BRPM | HEART RATE: 73 BPM

## 2018-08-16 DIAGNOSIS — I26.99 OTHER ACUTE PULMONARY EMBOLISM WITHOUT ACUTE COR PULMONALE (HCC): Primary | ICD-10-CM

## 2018-08-16 PROCEDURE — 99214 OFFICE O/P EST MOD 30 MIN: CPT | Performed by: INTERNAL MEDICINE

## 2018-08-16 RX ORDER — FERROUS SULFATE 325(65) MG
325 TABLET ORAL
COMMUNITY
End: 2019-01-26

## 2018-08-17 ENCOUNTER — HOSPITAL ENCOUNTER (OUTPATIENT)
Dept: RADIOLOGY | Facility: HOSPITAL | Age: 23
Discharge: HOME | End: 2018-08-17
Attending: FAMILY MEDICINE
Payer: COMMERCIAL

## 2018-08-17 DIAGNOSIS — I82.412 ACUTE DEEP VEIN THROMBOSIS (DVT) OF FEMORAL VEIN OF LEFT LOWER EXTREMITY (HCC): ICD-10-CM

## 2018-08-17 PROCEDURE — 93971 EXTREMITY STUDY: CPT | Performed by: SURGERY

## 2018-08-17 PROCEDURE — 93971 EXTREMITY STUDY: CPT

## 2018-08-20 ENCOUNTER — TELEPHONE (OUTPATIENT)
Dept: FAMILY MEDICINE CLINIC | Facility: CLINIC | Age: 23
End: 2018-08-20

## 2018-08-21 LAB
APCR PPP: 2.2 RATIO (ref 2.2–3.5)
APTT SCREEN TO CONFIRM RATIO: 0.97 RATIO (ref 0–1.4)
AT III ACT/NOR PPP CHRO: 61 % (ref 75–135)
B2 GLYCOPROT1 IGA SER-ACNC: <9 GPI IGA UNITS (ref 0–25)
B2 GLYCOPROT1 IGG SER-ACNC: <9 GPI IGG UNITS (ref 0–20)
B2 GLYCOPROT1 IGM SER-ACNC: <9 GPI IGM UNITS (ref 0–32)
CARDIOLIPIN IGA SER IA-ACNC: <9 APL U/ML (ref 0–11)
CARDIOLIPIN IGG SER IA-ACNC: <9 GPL U/ML (ref 0–14)
CARDIOLIPIN IGM SER IA-ACNC: 21 MPL U/ML (ref 0–12)
CONFIRM APTT/NORMAL: 44.4 SEC (ref 0–55)
DRVVT IMM 1:2 NP PPP: 41.5 SEC (ref 0–47)
F2 GENE MUT ANL BLD/T: ABNORMAL
HCYS SERPL-SCNC: 10.4 UMOL/L (ref 0–15)
LA PPP-IMP: ABNORMAL
PLG PPP CHRO-ACNC: 114 % (ref 70–150)
PROT C ACT/NOR PPP: 144 % (ref 73–180)
PROT S ACT/NOR PPP: 121 % (ref 57–157)
PROTHROM IGG SERPL-ACNC: 3 G UNITS (ref 0–20)
PS IGA SER-ACNC: 1 APS IGA (ref 0–20)
PS IGG SER-ACNC: 4 GPS IGG (ref 0–11)
PS IGM SER-ACNC: 79 MPS IGM (ref 0–25)
SCREEN APTT: 47.8 SEC (ref 0–51.9)
SCREEN DRVVT: 50.4 SEC (ref 0–47)

## 2018-09-10 ENCOUNTER — OFFICE VISIT (OUTPATIENT)
Dept: HEMATOLOGY ONCOLOGY | Facility: MEDICAL CENTER | Age: 23
End: 2018-09-10
Payer: COMMERCIAL

## 2018-09-10 VITALS
TEMPERATURE: 98.1 F | RESPIRATION RATE: 18 BRPM | DIASTOLIC BLOOD PRESSURE: 64 MMHG | WEIGHT: 133 LBS | HEIGHT: 65 IN | HEART RATE: 59 BPM | BODY MASS INDEX: 22.16 KG/M2 | OXYGEN SATURATION: 100 % | SYSTOLIC BLOOD PRESSURE: 110 MMHG

## 2018-09-10 DIAGNOSIS — I26.99 OTHER ACUTE PULMONARY EMBOLISM WITHOUT ACUTE COR PULMONALE (HCC): Primary | ICD-10-CM

## 2018-09-10 PROCEDURE — 99214 OFFICE O/P EST MOD 30 MIN: CPT | Performed by: INTERNAL MEDICINE

## 2018-09-10 NOTE — PROGRESS NOTES
[facility]  1995  [address] [facility] [address] [place] [id number]    DISCUSSION  SUMMARY:    31-year-old female recently admitted to the hospital with shortness of breath, lightheadedness and dizziness  Workup demonstrated left lower extremity DVT and bilateral PE  Issues:    1  Left lower extremity DVT and bilateral PE  From a respiratory standpoint patient is doing well  The left lower extremity is still slightly swollen, same as before  Recent Dopplers demonstrated chronic DVT in the common femoral vein, external and, iliac vein  The thrombophilia evaluation demonstrated an elevated antiphosphatidylserine IgM; rest of the test were within normal limits  The concern is whether not patient has an antiphospholipid syndrome  This will need to be serialized  Patient is to return in 2 months with a repeat level before  For the time being, patient is to continue with the Eliquis  Patient will continue to monitor for excessive bruising or bleeding  Patient is to continue with the compression stocking (specially now since 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)  We rediscussed what patient needs to monitor for in regard to lower extremity swelling, pain, cords or any acute pulmonary issues  If the pulmonary issues worsen, patient is to call the office immediately  2  Dizziness, abdominal pain on admission - resolved  3  Heavy menstrual periods  This is obviously associated with the anticoagulation  We discussed what to monitor for in regard to progressive anemia  Patient Is on an iron supplement, a vitamin-C supplement and a multivitamin with iron  Gynecology follow-ups are ongoing  Patient has decided on an IUD for birth control  Repeat CBC has been ordered for the next office visit      Patient is to return in 2 months   Patient knows to call the hematology/oncology office if there are any other questions or concerns  Carefully review your medication list and verify that the list is accurate and up-to-date  Please call the hematology/oncology office if there are medications missing from the list, medications on the list that you are not currently taking or if there is a dosage or instruction that is different from how you're taking that medication  Patient goals and areas of care:  Continue with anticoagulation  Patient is able to self-care   _____________________________________________________________________________________    Chief Complaint   Patient presents with    Follow-up     DVT, PE     History of Present Illness:    42-year-old female recently admitted to the hospital with dizziness, abdominal pain and lightheadedness  Workup demonstrated extensive left lower extremity DVT and bilateral PE predominantly on the right  Patient was treated with Lovenox initially, subsequently placed on Eliquis  Patient stabilized and was discharged and returns for follow-up  Mrs Estefany Solorio states feeling okay, about the same as before  No chest pain or pressure  No shortness of breath or dyspnea on exertion  No cough, sputum or hemoptysis  Left lower extremity is still slightly swollen, about the same as before  No problems with excessive bruising or bleeding other than the heavy menstrual periods which are about the same as before  Fatigue is minimal    Appetite is good, no nausea vomiting, no abdominal pain  No bloating  Activities are close to baseline, patient has started nursing school  No  issues  Review of Systems   Constitutional: Positive for fatigue  HENT: Negative  Eyes: Negative  Respiratory: Negative  Cardiovascular: Positive for leg swelling  Gastrointestinal: Negative  Endocrine: Negative  Genitourinary: Positive for menstrual problem  Musculoskeletal: Negative  Skin: Negative  Allergic/Immunologic: Negative  Neurological: Negative  Hematological: Negative  Psychiatric/Behavioral: Negative  All other systems reviewed and are negative  Patient Active Problem List   Diagnosis    Pulmonary emboli (HCC)    Acute deep vein thrombosis (DVT) of femoral vein of left lower extremity (HCC)     Past Medical History:   Diagnosis Date    Depression with anxiety 01/07/2012    [facility] [address] )     Headache 01/07/2012    Pulmonary embolism (HCC)     Raynaud disease     TMJ arthralgia     last assessed 04/29/2015     Past Surgical History:   Procedure Laterality Date    NO PAST SURGERIES       Family History   Problem Relation Age of Onset    No Known Problems Mother     No Known Problems Father     Cancer Maternal Grandmother     Hypertension Maternal Grandmother      Social History     Social History    Marital status: Single     Spouse name: N/A    Number of children: N/A    Years of education: N/A     Occupational History    Not on file  Social History Main Topics    Smoking status: Never Smoker    Smokeless tobacco: Never Used    Alcohol use Yes      Comment: socially    Drug use: No    Sexual activity: Yes      Comment: birth control pills      Other Topics Concern    Not on file     Social History Narrative    No narrative on file       Current Outpatient Prescriptions:     Acetaminophen (TYLENOL) 325 MG CAPS, Take by mouth, Disp: , Rfl:     apixaban (ELIQUIS) 5 mg, Take 1 tablet (5 mg total) by mouth 2 (two) times a day, Disp: 60 tablet, Rfl: 5    ferrous sulfate 325 (65 Fe) mg tablet, Take 325 mg by mouth daily with breakfast, Disp: , Rfl:     No Known Allergies    Vitals:    09/10/18 1041   BP: 110/64   Pulse: 59   Resp: 18   Temp: 98 1 °F (36 7 °C)   SpO2: 100%     Physical Exam   Constitutional: She is oriented to person, place, and time  She appears well-developed and well-nourished  Well-nourished young female, no respiratory distress   HENT:   Head: Normocephalic and atraumatic  Right Ear: External ear normal    Left Ear: External ear normal    Nose: Nose normal    Mouth/Throat: Oropharynx is clear and moist    Eyes: Conjunctivae and EOM are normal  Pupils are equal, round, and reactive to light  Neck: Normal range of motion  Neck supple  Cardiovascular: Normal rate, regular rhythm, normal heart sounds and intact distal pulses  Pulmonary/Chest: Effort normal and breath sounds normal    Good air entry bilaterally, clear bilaterally   Abdominal: Soft  Bowel sounds are normal    Soft, nontender, no hepatosplenomegaly, +bowel sounds   Musculoskeletal: Normal range of motion  Neurological: She is alert and oriented to person, place, and time  She has normal reflexes  Skin: Skin is warm  Warm, moist, good color, no petechiae or ecchymoses, well tanned   Psychiatric: She has a normal mood and affect  Her behavior is normal  Judgment and thought content normal    Extremities:  No right lower extremity edema, left lower extremity 0-1+ edema, no cords, pulses are 1+ bilaterally   Lymphatics:  No adenopathy in the neck, supraclavicular region, axilla and groin bilaterally    Labs:    06/25/2018 WBC = 11 3 hemoglobin = 13 4 hematocrit = 39 7 MCV = 92 platelet = 318  66/85/8686 WBC = 6 8 hemoglobin = 12 2 hematocrit = 36 7 MCV = 95 platelet = 280 neutrophil = 46% lymphocytes = 41% monocyte = 10% BUN = 4 creatinine = 0 84    Imaging    08/17/2018 bilateral lower extremity Dopplers    RIGHT LOWER LIMB LIMITED:  Evaluation shows no evidence of thrombus in the common femoral vein  Doppler evaluation shows a normal response to augmentation maneuvers  LEFT LOWER LIMB:  Chronic deep vein thrombosis in the common femoral vein,external and common  iliac vein  Resolution of the prior deep vein thrombosis in the femoral vein  Resolution of the superficial thrombosis at the origin of the great saphenous  vein  No evidence of superficial thrombophlebitis noted    Doppler evaluation shows a normal response to augmentation maneuvers  Popliteal, posterior tibial and anterior tibial arterial Doppler waveforms are  triphasic     05/17/2018 vascular lower limb duplex study bilateral     RIGHT LOWER LIMB:  No evidence of acute or chronic deep vein thrombosis  No evidence of superficial thrombophlebitis noted  Doppler evaluation shows a normal response to augmentation maneuvers  Popliteal, posterior tibial and anterior tibial arterial Doppler waveforms are  triphasic      LEFT LOWER LIMB:  Acute Occlusive Deep vein thrombosis in the external iliac, common iliac,  common femoral, proximal femoral vein  Acute non-occlusive Superficial thrombosis noted at the origin of the great  saphenous vein  No evidence of acute or chronic deep vein thrombosis in the mid and distal  femoral vein,popliteal vein  and calf veins  No evidence of superficial thrombophlebitis noted in the small saphenous vein  Doppler evaluation shows a normal response to respiration maneuvers  Popliteal, posterior tibial and anterior tibial arterial Doppler waveforms are  triphasic      05/16/2018 CTA chest PE study     PULMONARY ARTERIAL TREE:  Large quantity of right-sided pulmonary arterial embolism extending from the distal aspect of the right pulmonary artery is dominantly into the right middle lobe and right lower lobe pulmonary arterial vasculature   Small quantity of scattered left-sided pulmonary embolus      IMPRESSION: Large quantity of right and small quantity of left acute pulmonary arterial embolism      05/16/2018 CT head without contrast impression stated no acute intracranial abnormality  Pathology    Patient underwent thrombophilia evaluation on 08/14/2018  Homocystine, plasminogen, antithrombin activity, protein C functional, protein S free, PTT-LA, activated protein C resistance, DRVVT, makes and confirmation ratio were all within normal limits    Anti cardiolipin antibodies, beta 2 glycoprotein 1 antibodies, prothrombin antibodies were within normal limits  The factor II (prothrombin gene) DNA analysis did not demonstrated mutation  There was no factor 5 Leiden performed  Anti phosphatidylserine IgM was elevated at 79 (0-25)

## 2018-09-12 ENCOUNTER — TRANSCRIBE ORDERS (OUTPATIENT)
Dept: HEMATOLOGY ONCOLOGY | Facility: MEDICAL CENTER | Age: 23
End: 2018-09-12

## 2018-09-12 DIAGNOSIS — I82.412 ACUTE DEEP VEIN THROMBOSIS (DVT) OF FEMORAL VEIN OF LEFT LOWER EXTREMITY (HCC): ICD-10-CM

## 2018-09-12 DIAGNOSIS — I26.99 OTHER PULMONARY EMBOLISM WITHOUT ACUTE COR PULMONALE, UNSPECIFIED CHRONICITY (HCC): Primary | ICD-10-CM

## 2018-09-19 ENCOUNTER — HOSPITAL ENCOUNTER (OUTPATIENT)
Dept: RADIOLOGY | Facility: HOSPITAL | Age: 23
Discharge: HOME | End: 2018-09-19
Attending: FAMILY MEDICINE
Payer: COMMERCIAL

## 2018-09-19 ENCOUNTER — TRANSCRIBE ORDERS (OUTPATIENT)
Dept: ADMINISTRATIVE | Facility: HOSPITAL | Age: 23
End: 2018-09-19

## 2018-09-19 ENCOUNTER — APPOINTMENT (OUTPATIENT)
Dept: LAB | Facility: HOSPITAL | Age: 23
End: 2018-09-19
Attending: FAMILY MEDICINE
Payer: COMMERCIAL

## 2018-09-19 ENCOUNTER — OFFICE VISIT (OUTPATIENT)
Dept: FAMILY MEDICINE CLINIC | Facility: CLINIC | Age: 23
End: 2018-09-19
Payer: COMMERCIAL

## 2018-09-19 VITALS
SYSTOLIC BLOOD PRESSURE: 130 MMHG | HEIGHT: 65 IN | DIASTOLIC BLOOD PRESSURE: 80 MMHG | OXYGEN SATURATION: 98 % | TEMPERATURE: 97.6 F | HEART RATE: 97 BPM | RESPIRATION RATE: 16 BRPM | BODY MASS INDEX: 21.99 KG/M2 | WEIGHT: 132 LBS

## 2018-09-19 DIAGNOSIS — R00.2 PALPITATION: ICD-10-CM

## 2018-09-19 DIAGNOSIS — R53.83 OTHER FATIGUE: ICD-10-CM

## 2018-09-19 DIAGNOSIS — I26.99 OTHER PULMONARY EMBOLISM WITHOUT ACUTE COR PULMONALE, UNSPECIFIED CHRONICITY (HCC): Primary | ICD-10-CM

## 2018-09-19 DIAGNOSIS — I26.99 OTHER PULMONARY EMBOLISM WITHOUT ACUTE COR PULMONALE, UNSPECIFIED CHRONICITY (HCC): ICD-10-CM

## 2018-09-19 LAB
BASOPHILS # BLD AUTO: 0.02 THOUSANDS/ΜL (ref 0–0.1)
BASOPHILS NFR BLD AUTO: 0 % (ref 0–1)
EOSINOPHIL # BLD AUTO: 0.21 THOUSAND/ΜL (ref 0–0.61)
EOSINOPHIL NFR BLD AUTO: 2 % (ref 0–6)
ERYTHROCYTE [DISTWIDTH] IN BLOOD BY AUTOMATED COUNT: 11.7 % (ref 11.6–15.1)
HCT VFR BLD AUTO: 42.5 % (ref 34.8–46.1)
HGB BLD-MCNC: 14.3 G/DL (ref 11.5–15.4)
IMM GRANULOCYTES # BLD AUTO: 0.03 THOUSAND/UL (ref 0–0.2)
IMM GRANULOCYTES NFR BLD AUTO: 0 % (ref 0–2)
LYMPHOCYTES # BLD AUTO: 3.04 THOUSANDS/ΜL (ref 0.6–4.47)
LYMPHOCYTES NFR BLD AUTO: 31 % (ref 14–44)
MCH RBC QN AUTO: 31.3 PG (ref 26.8–34.3)
MCHC RBC AUTO-ENTMCNC: 33.6 G/DL (ref 31.4–37.4)
MCV RBC AUTO: 93 FL (ref 82–98)
MONOCYTES # BLD AUTO: 0.82 THOUSAND/ΜL (ref 0.17–1.22)
MONOCYTES NFR BLD AUTO: 8 % (ref 4–12)
NEUTROPHILS # BLD AUTO: 5.78 THOUSANDS/ΜL (ref 1.85–7.62)
NEUTS SEG NFR BLD AUTO: 59 % (ref 43–75)
NRBC BLD AUTO-RTO: 0 /100 WBCS
PLATELET # BLD AUTO: 302 THOUSANDS/UL (ref 149–390)
PMV BLD AUTO: 10.2 FL (ref 8.9–12.7)
RBC # BLD AUTO: 4.57 MILLION/UL (ref 3.81–5.12)
WBC # BLD AUTO: 9.9 THOUSAND/UL (ref 4.31–10.16)

## 2018-09-19 PROCEDURE — 99214 OFFICE O/P EST MOD 30 MIN: CPT | Performed by: FAMILY MEDICINE

## 2018-09-19 PROCEDURE — 85025 COMPLETE CBC W/AUTO DIFF WBC: CPT

## 2018-09-19 PROCEDURE — 3008F BODY MASS INDEX DOCD: CPT | Performed by: FAMILY MEDICINE

## 2018-09-19 PROCEDURE — 71275 CT ANGIOGRAPHY CHEST: CPT

## 2018-09-19 PROCEDURE — 36415 COLL VENOUS BLD VENIPUNCTURE: CPT

## 2018-09-19 RX ADMIN — IOHEXOL 85 ML: 350 INJECTION, SOLUTION INTRAVENOUS at 11:07

## 2018-09-19 NOTE — PROGRESS NOTES
Chief Complaint   Patient presents with    Fatigue    Headache    Fever        Patient ID: Jemima Carpenter is a 26 y o  female  HPI  Pt is seeing for HA, palpitation, SOB started 2-3 days ago -  Had similar symptoms prior to Dx with b/l PE 4 m ago -  Last CTA chest on 7/30/18 showed resolution of PE, had extensive L leg DVT at that time as well - f/u doppler 1 m ago showed chronic DVT - pt is wearing compression stocking, noticed worsening of L leg swelling by evening time over last few days - on Eliquis, has heavy periods for last 3 m     The following portions of the patient's history were reviewed and updated as appropriate: allergies, current medications, past family history, past medical history, past social history, past surgical history and problem list     Review of Systems   Constitutional: Positive for fatigue  Negative for appetite change  HENT: Negative  Respiratory: Positive for shortness of breath  Negative for cough and wheezing  Cardiovascular: Positive for palpitations and leg swelling  Negative for chest pain  Gastrointestinal: Negative  Genitourinary: Negative  Musculoskeletal: Negative  Neurological: Positive for headaches  Negative for dizziness  Psychiatric/Behavioral: The patient is nervous/anxious  Current Outpatient Prescriptions   Medication Sig Dispense Refill    Acetaminophen (TYLENOL) 325 MG CAPS Take by mouth      apixaban (ELIQUIS) 5 mg Take 1 tablet (5 mg total) by mouth 2 (two) times a day 60 tablet 5    ferrous sulfate 325 (65 Fe) mg tablet Take 325 mg by mouth daily with breakfast       No current facility-administered medications for this visit          Objective:    /80 (BP Location: Left arm, Patient Position: Sitting, Cuff Size: Standard)   Pulse 97   Temp 97 6 °F (36 4 °C)   Resp 16   Ht 5' 4 5" (1 638 m)   Wt 59 9 kg (132 lb)   SpO2 98%   BMI 22 31 kg/m²        Physical Exam   Constitutional: She is oriented to person, place, and time  No distress  Cardiovascular: Regular rhythm and normal heart sounds  Tachycardia present  Exam reveals no gallop  No murmur heard  Pulmonary/Chest: Effort normal  No tachypnea  No respiratory distress  She has no decreased breath sounds  She has no wheezes  She has no rhonchi  She has no rales  Neurological: She is oriented to person, place, and time  No cranial nerve deficit  Psychiatric: Her mood appears anxious  Labs in chart were reviewed  Assessment/Plan:         Diagnoses and all orders for this visit:    Other pulmonary embolism without acute cor pulmonale, unspecified chronicity ([facility] [address] )  -     CTA chest pe study; Future -  Stat     Other fatigue  -     CTA chest pe study; Future  -     CBC and differential; Future -  Stat     Palpitation  -     CTA chest pe study;  Future  -     CBC and differential; Future            rto after the test                 Johan Estrada MD

## 2018-10-18 ENCOUNTER — TELEPHONE (OUTPATIENT)
Dept: FAMILY MEDICINE CLINIC | Facility: CLINIC | Age: 23
End: 2018-10-18

## 2018-10-18 NOTE — TELEPHONE ENCOUNTER
Dr Richar Gutierres    Patient dropped off temporary disability extension form for you to fill out online  Form placed in Dr Richar Gutierres folder, copy in drawer up front  Patient is aware that Dr Richar Gutierres is not in the office today

## 2018-10-19 NOTE — TELEPHONE ENCOUNTER
[facility] - please advise patient to schedule an appt  With Dr Addison Martinez, ty

## 2018-10-23 ENCOUNTER — OFFICE VISIT (OUTPATIENT)
Dept: FAMILY MEDICINE CLINIC | Facility: CLINIC | Age: 23
End: 2018-10-23
Payer: COMMERCIAL

## 2018-10-23 VITALS
BODY MASS INDEX: 22.49 KG/M2 | SYSTOLIC BLOOD PRESSURE: 124 MMHG | HEART RATE: 88 BPM | TEMPERATURE: 97.4 F | RESPIRATION RATE: 16 BRPM | HEIGHT: 65 IN | WEIGHT: 135 LBS | DIASTOLIC BLOOD PRESSURE: 80 MMHG

## 2018-10-23 DIAGNOSIS — I82.412 ACUTE DEEP VEIN THROMBOSIS (DVT) OF FEMORAL VEIN OF LEFT LOWER EXTREMITY (HCC): ICD-10-CM

## 2018-10-23 DIAGNOSIS — I26.99 OTHER PULMONARY EMBOLISM WITHOUT ACUTE COR PULMONALE, UNSPECIFIED CHRONICITY (HCC): Primary | ICD-10-CM

## 2018-10-23 PROCEDURE — 1036F TOBACCO NON-USER: CPT | Performed by: FAMILY MEDICINE

## 2018-10-23 PROCEDURE — 99214 OFFICE O/P EST MOD 30 MIN: CPT | Performed by: FAMILY MEDICINE

## 2018-10-23 PROCEDURE — 3008F BODY MASS INDEX DOCD: CPT | Performed by: FAMILY MEDICINE

## 2018-10-23 NOTE — PROGRESS NOTES
Chief Complaint   Patient presents with    Follow-up   PE, DVT     Patient ID: Keenan Vasquez is a 26 y o  female  HPI  Pt has b/l PE and extensive DVT L leg 5 m ago -  Feeling almost back to baseline - L leg swelling only with exertion - wearing compression stocking on and off - no Sob at work -  Normal CT chest -  LLE doppler showed chronic DVT -  On Eliquis, f/u with hematologist     The following portions of the patient's history were reviewed and updated as appropriate: allergies, current medications, past family history, past medical history, past social history, past surgical history and problem list     Review of Systems   Constitutional: Negative  Respiratory: Negative  Cardiovascular: Positive for leg swelling  Negative for chest pain and palpitations  Gastrointestinal: Negative  Genitourinary: Negative  Musculoskeletal: Negative  Skin: Negative  Neurological: Negative  Current Outpatient Prescriptions   Medication Sig Dispense Refill    Acetaminophen (TYLENOL) 325 MG CAPS Take by mouth      apixaban (ELIQUIS) 5 mg Take 1 tablet (5 mg total) by mouth 2 (two) times a day 60 tablet 5    ferrous sulfate 325 (65 Fe) mg tablet Take 325 mg by mouth daily with breakfast       No current facility-administered medications for this visit  Objective:    /80 (BP Location: Left arm, Patient Position: Sitting, Cuff Size: Standard)   Pulse 88   Temp (!) 97 4 °F (36 3 °C) (Tympanic)   Resp 16   Ht 5' 4 5" (1 638 m)   Wt 61 2 kg (135 lb)   BMI 22 81 kg/m²        Physical Exam   Constitutional: She appears well-nourished  No distress  Cardiovascular: Normal rate and regular rhythm  Exam reveals no gallop  No murmur heard  Pulmonary/Chest: Effort normal and breath sounds normal  No respiratory distress  She has no wheezes  She has no rales  Musculoskeletal: She exhibits no edema or tenderness  Neurological: No cranial nerve deficit  Skin: No rash noted   No erythema  No pallor  Psychiatric: She has a normal mood and affect  Labs in chart were reviewed        Assessment/Plan:         Diagnoses and all orders for this visit:    Other pulmonary embolism without acute cor pulmonale, unspecified chronicity (HCC)    Acute deep vein thrombosis (DVT) of femoral vein of left lower extremity ([facility] [address] )      cont Eliquis and f/u with hematologist     Will be clear to work on 11/19/18    rto prn                   Ari Arredondo MD

## 2018-10-30 LAB
BASOPHILS # BLD AUTO: 0 X10E3/UL (ref 0–0.2)
BASOPHILS NFR BLD AUTO: 0 %
EOSINOPHIL # BLD AUTO: 0.3 X10E3/UL (ref 0–0.4)
EOSINOPHIL NFR BLD AUTO: 4 %
ERYTHROCYTE [DISTWIDTH] IN BLOOD BY AUTOMATED COUNT: 13.1 % (ref 12.3–15.4)
F5 GENE MUT ANL BLD/T: NORMAL
HCT VFR BLD AUTO: 40.5 % (ref 34–46.6)
HGB BLD-MCNC: 13.9 G/DL (ref 11.1–15.9)
IMM GRANULOCYTES # BLD: 0 X10E3/UL (ref 0–0.1)
IMM GRANULOCYTES NFR BLD: 0 %
LYMPHOCYTES # BLD AUTO: 3 X10E3/UL (ref 0.7–3.1)
LYMPHOCYTES NFR BLD AUTO: 33 %
MCH RBC QN AUTO: 31.7 PG (ref 26.6–33)
MCHC RBC AUTO-ENTMCNC: 34.3 G/DL (ref 31.5–35.7)
MCV RBC AUTO: 93 FL (ref 79–97)
MONOCYTES # BLD AUTO: 0.6 X10E3/UL (ref 0.1–0.9)
MONOCYTES NFR BLD AUTO: 6 %
NEUTROPHILS # BLD AUTO: 5 X10E3/UL (ref 1.4–7)
NEUTROPHILS NFR BLD AUTO: 57 %
PLATELET # BLD AUTO: 288 X10E3/UL (ref 150–379)
PS IGM SER-ACNC: 31 MPS IGM (ref 0–25)
RBC # BLD AUTO: 4.38 X10E6/UL (ref 3.77–5.28)
WBC # BLD AUTO: 8.9 X10E3/UL (ref 3.4–10.8)

## 2018-11-12 ENCOUNTER — OFFICE VISIT (OUTPATIENT)
Dept: HEMATOLOGY ONCOLOGY | Facility: MEDICAL CENTER | Age: 23
End: 2018-11-12
Payer: COMMERCIAL

## 2018-11-12 VITALS
SYSTOLIC BLOOD PRESSURE: 120 MMHG | TEMPERATURE: 98.3 F | HEART RATE: 60 BPM | DIASTOLIC BLOOD PRESSURE: 78 MMHG | WEIGHT: 133 LBS | HEIGHT: 65 IN | RESPIRATION RATE: 18 BRPM | BODY MASS INDEX: 22.16 KG/M2 | OXYGEN SATURATION: 99 %

## 2018-11-12 DIAGNOSIS — I82.412 ACUTE DEEP VEIN THROMBOSIS (DVT) OF FEMORAL VEIN OF LEFT LOWER EXTREMITY (HCC): ICD-10-CM

## 2018-11-12 DIAGNOSIS — I26.99 OTHER PULMONARY EMBOLISM WITHOUT ACUTE COR PULMONALE, UNSPECIFIED CHRONICITY (HCC): Primary | ICD-10-CM

## 2018-11-12 PROCEDURE — 99214 OFFICE O/P EST MOD 30 MIN: CPT | Performed by: INTERNAL MEDICINE

## 2018-11-12 NOTE — PROGRESS NOTES
Garrett Saucedo  1995  [address] [facility] [address] [place] [facility] [address]    DISCUSSION  SUMMARY:    53-year-old female recently admitted to the hospital with shortness of breath, lightheadedness and dizziness  Workup demonstrated left lower extremity DVT and bilateral PE  Issues:    1  Left lower extremity DVT and bilateral PE  Patient is doing well from a respiratory standpoint  There is virtually no left lower extremity swelling  Patient's activities are baseline; in fact, patient is very active  Previous Dopplers demonstrated chronic DVT in the common femoral vein and iliac vein  The thrombophilia evaluation did not demonstrate any abnormality other than the elevated antiphosphatidylserine IgM  Repeat testing still demonstrated an elevated level but much lower than before and closer to the normal range  At this time, there is no clear evidence for a thrombophilia  Patient has completed 6 months of Eliquis  Patient has also had issues with the Eliquis including heavy menstrual periods  At length we discussed options  Ms Kayley Ugarte is to discontinue the Eliquis  Patient will make sure that she wears support stockings while working (in school to become a nurse; doing clinicals presently)  We rediscussed what patient needs to monitor for in regard to lower extremity swelling, pain, cords or any acute pulmonary issues  Patient understands that is obviously impossible to predict the future  If Ms Kayley Ugarte should have another thrombotic complication, she would need anticoagulation for life  Patient has a copper IUD in place  2  Dizziness, abdominal pain on admission - resolved  3  Heavy menstrual periods  The heavy menstrual periods were likely (at least partially) caused by the anticoagulation  This should become less of an issue now that patient is to stop the Eliquis    If the menstrual periods continue to be heavy, patient needs to return to her gynecologist   Recent hemoglobin level was quite good/acceptable  Patient understands that she cannot take birth control pills  4  Child bearing  Patient states that she is a long way off from having children  We discussed the potential risk of thrombosis during pregnancy  Patient should be seen by high risk obstetrician before she tries to get pregnant  5  Hematology follow-up  We discussed follow-up  I do not believe that routine Hematology follow-up is needed  Patient will call if she has any new hematology issues  Ms Jaiden Shields will also call if she is in need of any invasive procedure or surgical procedure (high risk; consideration for aggressive prophylaxis and retrievable filter)  Patient knows to call the hematology/oncology office if there are any other questions or concerns  Carefully review your medication list and verify that the list is accurate and up-to-date  Please call the hematology/oncology office if there are medications missing from the list, medications on the list that you are not currently taking or if there is a dosage or instruction that is different from how you're taking that medication  Patient goals and areas of care:  Continue with anticoagulation  Barriers to care:  None  Patient is able to self-care   _____________________________________________________________________________________    Chief Complaint   Patient presents with    Follow-up     Left lower extremity DVT and PE     History of Present Illness:    82-year-old female recently admitted to the hospital with dizziness, abdominal pain and lightheadedness  Workup demonstrated extensive left lower extremity DVT and bilateral PE predominantly on the right  Patient was treated with Lovenox initially, subsequently placed on Eliquis - recently completed 6 months  Patient returns for follow-up  Mrs Jaiden Shields states feeling well   Patient denies any shortness of breath or dyspnea on exertion  No chest pain or pressure  No cough, sputum or hemoptysis  No problems with excessive bruising or bleeding other than heavy menstrual periods (intervals are regular)  Appetite is good, weight is stable  Patient continues to be very active; is now in nursing school  Ms Yoshi Carmichael states that she believes her left calf gets slightly swollen especially with being on her feet all day  No cords, pain or redness  Usually both legs are of the same size  Patient wears a compression stocking if she is going to be on her feet for any length of time  Review of Systems   Constitutional: Negative for fatigue  HENT: Negative  Eyes: Negative  Respiratory: Negative  Cardiovascular: Negative for leg swelling  Gastrointestinal: Negative  Endocrine: Negative  Genitourinary: Positive for menstrual problem  Musculoskeletal: Negative  Skin: Negative  Allergic/Immunologic: Negative  Neurological: Negative  Hematological: Negative  Psychiatric/Behavioral: Negative  All other systems reviewed and are negative  Patient Active Problem List   Diagnosis    Pulmonary emboli (HCC)    Acute deep vein thrombosis (DVT) of femoral vein of left lower extremity (HCC)     Past Medical History:   Diagnosis Date    Depression with anxiety 01/07/2012    DVT (deep venous thrombosis) ([facility] [address] )     Headache 01/07/2012    Pulmonary embolism (HCC)     Raynaud disease     TMJ arthralgia     last assessed 04/29/2015     Past Surgical History:   Procedure Laterality Date    NO PAST SURGERIES       Family History   Problem Relation Age of Onset    No Known Problems Mother     No Known Problems Father     Cancer Maternal Grandmother     Hypertension Maternal Grandmother      Social History     Social History    Marital status: Single     Spouse name: N/A    Number of children: N/A    Years of education: N/A     Occupational History    Not on file       Social History Main Topics  Smoking status: Never Smoker    Smokeless tobacco: Never Used    Alcohol use Yes      Comment: socially    Drug use: No    Sexual activity: Yes      Comment: birth control pills      Other Topics Concern    Not on file     Social History Narrative    No narrative on file       Current Outpatient Prescriptions:     Acetaminophen (TYLENOL) 325 MG CAPS, Take by mouth, Disp: , Rfl:     apixaban (ELIQUIS) 5 mg, Take 1 tablet (5 mg total) by mouth 2 (two) times a day, Disp: 60 tablet, Rfl: 5    ferrous sulfate 325 (65 Fe) mg tablet, Take 325 mg by mouth daily with breakfast, Disp: , Rfl:     No Known Allergies    Vitals:    11/12/18 1103   BP: 120/78   Pulse: 60   Resp: 18   Temp: 98 3 °F (36 8 °C)   SpO2: 99%     Physical Exam   Constitutional: She is oriented to person, place, and time  She appears well-developed and well-nourished  Well-nourished young female, no respiratory distress   HENT:   Head: Normocephalic and atraumatic  Right Ear: External ear normal    Left Ear: External ear normal    Nose: Nose normal    Mouth/Throat: Oropharynx is clear and moist    Eyes: Pupils are equal, round, and reactive to light  Conjunctivae and EOM are normal    Neck: Normal range of motion  Neck supple  Cardiovascular: Normal rate, regular rhythm, normal heart sounds and intact distal pulses  Pulmonary/Chest: Effort normal and breath sounds normal    Lungs with good air entry bilaterally, clear   Abdominal: Soft  Bowel sounds are normal    Musculoskeletal: Normal range of motion  Neurological: She is alert and oriented to person, place, and time  She has normal reflexes  Skin: Skin is warm  Warm, moist, good color, no petechiae or ecchymoses   Psychiatric: She has a normal mood and affect   Her behavior is normal  Judgment and thought content normal    Extremities:  No lower extremity edema bilaterally, no cords, pulses are 1+  Lymphatics:  No adenopathy in the neck, supraclavicular region, axilla and groin bilaterally    Labs:    10/25/2018 WBC = 8 9 hemoglobin = 13 9 hematocrit = 40 5 MCV = 93 platelet = 793 neutrophil = 57%  06/25/2018 WBC = 11 3 hemoglobin = 13 4 hematocrit = 39 7 MCV = 92 platelet = 361  49/79/2733 WBC = 6 8 hemoglobin = 12 2 hematocrit = 36 7 MCV = 95 platelet = 093 neutrophil = 46% lymphocytes = 41% monocyte = 10% BUN = 4 creatinine = 0 84    Imaging    08/17/2018 bilateral lower extremity Dopplers    RIGHT LOWER LIMB LIMITED:  Evaluation shows no evidence of thrombus in the common femoral vein  Doppler evaluation shows a normal response to augmentation maneuvers  LEFT LOWER LIMB:  Chronic deep vein thrombosis in the common femoral vein,external and common  iliac vein  Resolution of the prior deep vein thrombosis in the femoral vein  Resolution of the superficial thrombosis at the origin of the great saphenous  vein  No evidence of superficial thrombophlebitis noted  Doppler evaluation shows a normal response to augmentation maneuvers  Popliteal, posterior tibial and anterior tibial arterial Doppler waveforms are  triphasic     05/17/2018 vascular lower limb duplex study bilateral     RIGHT LOWER LIMB:  No evidence of acute or chronic deep vein thrombosis  No evidence of superficial thrombophlebitis noted  Doppler evaluation shows a normal response to augmentation maneuvers  Popliteal, posterior tibial and anterior tibial arterial Doppler waveforms are  triphasic      LEFT LOWER LIMB:  Acute Occlusive Deep vein thrombosis in the external iliac, common iliac,  common femoral, proximal femoral vein  Acute non-occlusive Superficial thrombosis noted at the origin of the great  saphenous vein  No evidence of acute or chronic deep vein thrombosis in the mid and distal  femoral vein,popliteal vein  and calf veins  No evidence of superficial thrombophlebitis noted in the small saphenous vein  Doppler evaluation shows a normal response to respiration maneuvers    Popliteal, posterior tibial and anterior tibial arterial Doppler waveforms are  triphasic      05/16/2018 CTA chest PE study     PULMONARY ARTERIAL TREE:  Large quantity of right-sided pulmonary arterial embolism extending from the distal aspect of the right pulmonary artery is dominantly into the right middle lobe and right lower lobe pulmonary arterial vasculature   Small quantity of scattered left-sided pulmonary embolus      IMPRESSION: Large quantity of right and small quantity of left acute pulmonary arterial embolism      05/16/2018 CT head without contrast impression stated no acute intracranial abnormality  Pathology    10/25/2018 factor 5 Leiden negative for mutation  10/25/2018 antiphosphatidylserine IgM = 31 (0-25)    Patient underwent thrombophilia evaluation on 08/14/2018  Homocystine, plasminogen, antithrombin activity, protein C functional, protein S free, PTT-LA, activated protein C resistance, DRVVT, makes and confirmation ratio were all within normal limits  Anti cardiolipin antibodies, beta 2 glycoprotein 1 antibodies, prothrombin antibodies were within normal limits  The factor II (prothrombin gene) DNA analysis did not demonstrated mutation  There was no factor 5 Leiden performed  Anti phosphatidylserine IgM was elevated at 79 (0-25)

## 2018-12-24 ENCOUNTER — OFFICE VISIT (OUTPATIENT)
Dept: FAMILY MEDICINE CLINIC | Facility: CLINIC | Age: 23
End: 2018-12-24
Payer: COMMERCIAL

## 2018-12-24 ENCOUNTER — TELEPHONE (OUTPATIENT)
Dept: FAMILY MEDICINE CLINIC | Facility: CLINIC | Age: 23
End: 2018-12-24

## 2018-12-24 VITALS
HEART RATE: 84 BPM | DIASTOLIC BLOOD PRESSURE: 80 MMHG | RESPIRATION RATE: 16 BRPM | BODY MASS INDEX: 22.66 KG/M2 | HEIGHT: 65 IN | TEMPERATURE: 97.6 F | SYSTOLIC BLOOD PRESSURE: 120 MMHG | WEIGHT: 136 LBS

## 2018-12-24 DIAGNOSIS — M79.602 PAIN OF LEFT UPPER EXTREMITY: Primary | ICD-10-CM

## 2018-12-24 PROCEDURE — 99213 OFFICE O/P EST LOW 20 MIN: CPT | Performed by: FAMILY MEDICINE

## 2018-12-24 PROCEDURE — 1036F TOBACCO NON-USER: CPT | Performed by: FAMILY MEDICINE

## 2018-12-24 NOTE — TELEPHONE ENCOUNTER
Dr Maurice Garland pt called to say her lt arm is warm and tingle   pls advise what to do  Pt decline appt

## 2018-12-24 NOTE — PROGRESS NOTES
Chief Complaint   Patient presents with    Tingling     left arm        Patient ID: Marysol Pro is a 26 y o  female  HPI  Pt is seeing for an episodes of tingling pain in L arm yesterday -  No redness, swelling, rashes -  No neck pain, no therapy tried, resolved today     The following portions of the patient's history were reviewed and updated as appropriate: allergies, current medications, past family history, past medical history, past social history, past surgical history and problem list     Review of Systems   Constitutional: Negative  Respiratory: Negative  Cardiovascular: Negative  Current Outpatient Prescriptions   Medication Sig Dispense Refill    Acetaminophen (TYLENOL) 325 MG CAPS Take by mouth      apixaban (ELIQUIS) 5 mg Take 1 tablet (5 mg total) by mouth 2 (two) times a day (Patient not taking: Reported on 12/24/2018 ) 60 tablet 5    ferrous sulfate 325 (65 Fe) mg tablet Take 325 mg by mouth daily with breakfast       No current facility-administered medications for this visit  Objective:    /80 (BP Location: Left arm, Patient Position: Sitting, Cuff Size: Standard)   Pulse 84   Temp 97 6 °F (36 4 °C) (Tympanic)   Resp 16   Ht 5' 4 5" (1 638 m)   Wt 61 7 kg (136 lb)   BMI 22 98 kg/m²        Physical Exam   Constitutional: No distress  Cardiovascular: Normal rate      Pulmonary/Chest: Effort normal    Musculoskeletal:        Right upper arm: Normal                Assessment/Plan:         Diagnoses and all orders for this visit:    Pain of left upper extremity        Normal exam  Reassurance was provided   rto yung Partida MD

## 2019-01-26 ENCOUNTER — HOSPITAL ENCOUNTER (EMERGENCY)
Facility: HOSPITAL | Age: 24
Discharge: HOME | End: 2019-01-26
Attending: EMERGENCY MEDICINE
Payer: COMMERCIAL

## 2019-01-26 ENCOUNTER — APPOINTMENT (EMERGENCY)
Dept: RADIOLOGY | Facility: HOSPITAL | Age: 24
End: 2019-01-26
Payer: COMMERCIAL

## 2019-01-26 VITALS
SYSTOLIC BLOOD PRESSURE: 109 MMHG | DIASTOLIC BLOOD PRESSURE: 60 MMHG | RESPIRATION RATE: 15 BRPM | OXYGEN SATURATION: 99 % | HEIGHT: 65 IN | WEIGHT: 135 LBS | TEMPERATURE: 98.2 F | HEART RATE: 82 BPM | BODY MASS INDEX: 22.49 KG/M2

## 2019-01-26 DIAGNOSIS — I31.9 PERICARDITIS: Primary | ICD-10-CM

## 2019-01-26 DIAGNOSIS — R09.1 PLEURISY: ICD-10-CM

## 2019-01-26 DIAGNOSIS — N39.0 UTI (URINARY TRACT INFECTION): ICD-10-CM

## 2019-01-26 LAB
ALBUMIN SERPL BCP-MCNC: 4 G/DL (ref 3.5–5)
ALP SERPL-CCNC: 75 U/L (ref 46–116)
ALT SERPL W P-5'-P-CCNC: 30 U/L (ref 12–78)
ANION GAP SERPL CALCULATED.3IONS-SCNC: 11 MMOL/L (ref 4–13)
AST SERPL W P-5'-P-CCNC: 32 U/L (ref 5–45)
BACTERIA UR QL AUTO: ABNORMAL /HPF
BASOPHILS # BLD AUTO: 0.03 THOUSANDS/ΜL (ref 0–0.1)
BASOPHILS NFR BLD AUTO: 0 % (ref 0–1)
BILIRUB DIRECT SERPL-MCNC: 0.1 MG/DL (ref 0–0.2)
BILIRUB SERPL-MCNC: 0.6 MG/DL (ref 0.2–1)
BILIRUB UR QL STRIP: NEGATIVE
BUN SERPL-MCNC: 10 MG/DL (ref 5–25)
CALCIUM SERPL-MCNC: 9.5 MG/DL (ref 8.3–10.1)
CHLORIDE SERPL-SCNC: 101 MMOL/L (ref 100–108)
CK MB SERPL-MCNC: 1.3 NG/ML (ref 0–5)
CK MB SERPL-MCNC: <1 % (ref 0–2.5)
CK SERPL-CCNC: 713 U/L (ref 26–192)
CLARITY UR: ABNORMAL
CO2 SERPL-SCNC: 26 MMOL/L (ref 21–32)
COLOR UR: YELLOW
CREAT SERPL-MCNC: 0.84 MG/DL (ref 0.6–1.3)
EOSINOPHIL # BLD AUTO: 0.1 THOUSAND/ΜL (ref 0–0.61)
EOSINOPHIL NFR BLD AUTO: 1 % (ref 0–6)
ERYTHROCYTE [DISTWIDTH] IN BLOOD BY AUTOMATED COUNT: 11.4 % (ref 11.6–15.1)
EXT PREG TEST URINE: NEGATIVE
GFR SERPL CREATININE-BSD FRML MDRD: 100 ML/MIN/1.73SQ M
GLUCOSE SERPL-MCNC: 109 MG/DL (ref 65–140)
GLUCOSE UR STRIP-MCNC: NEGATIVE MG/DL
HCT VFR BLD AUTO: 43.1 % (ref 34.8–46.1)
HGB BLD-MCNC: 14.2 G/DL (ref 11.5–15.4)
HGB UR QL STRIP.AUTO: ABNORMAL
IMM GRANULOCYTES # BLD AUTO: 0.04 THOUSAND/UL (ref 0–0.2)
IMM GRANULOCYTES NFR BLD AUTO: 0 % (ref 0–2)
KETONES UR STRIP-MCNC: NEGATIVE MG/DL
LEUKOCYTE ESTERASE UR QL STRIP: ABNORMAL
LIPASE SERPL-CCNC: 106 U/L (ref 73–393)
LYMPHOCYTES # BLD AUTO: 0.66 THOUSANDS/ΜL (ref 0.6–4.47)
LYMPHOCYTES NFR BLD AUTO: 5 % (ref 14–44)
MAGNESIUM SERPL-MCNC: 2 MG/DL (ref 1.6–2.6)
MCH RBC QN AUTO: 30.9 PG (ref 26.8–34.3)
MCHC RBC AUTO-ENTMCNC: 32.9 G/DL (ref 31.4–37.4)
MCV RBC AUTO: 94 FL (ref 82–98)
MONOCYTES # BLD AUTO: 0.96 THOUSAND/ΜL (ref 0.17–1.22)
MONOCYTES NFR BLD AUTO: 7 % (ref 4–12)
NEUTROPHILS # BLD AUTO: 11.77 THOUSANDS/ΜL (ref 1.85–7.62)
NEUTS SEG NFR BLD AUTO: 87 % (ref 43–75)
NITRITE UR QL STRIP: NEGATIVE
NON-SQ EPI CELLS URNS QL MICRO: ABNORMAL /HPF
NRBC BLD AUTO-RTO: 0 /100 WBCS
PH UR STRIP.AUTO: 6 [PH] (ref 5–9)
PLATELET # BLD AUTO: 235 THOUSANDS/UL (ref 149–390)
PMV BLD AUTO: 10.7 FL (ref 8.9–12.7)
POTASSIUM SERPL-SCNC: 3.8 MMOL/L (ref 3.5–5.3)
PROT SERPL-MCNC: 7.1 G/DL (ref 6.4–8.2)
PROT UR STRIP-MCNC: ABNORMAL MG/DL
RBC # BLD AUTO: 4.59 MILLION/UL (ref 3.81–5.12)
RBC #/AREA URNS AUTO: ABNORMAL /HPF
SODIUM SERPL-SCNC: 138 MMOL/L (ref 136–145)
SP GR UR STRIP.AUTO: 1.02 (ref 1–1.03)
TROPONIN I SERPL-MCNC: <0.02 NG/ML
UROBILINOGEN UR QL STRIP.AUTO: 0.2 E.U./DL
WBC # BLD AUTO: 13.56 THOUSAND/UL (ref 4.31–10.16)
WBC #/AREA URNS AUTO: ABNORMAL /HPF

## 2019-01-26 PROCEDURE — 96360 HYDRATION IV INFUSION INIT: CPT

## 2019-01-26 PROCEDURE — 84484 ASSAY OF TROPONIN QUANT: CPT | Performed by: EMERGENCY MEDICINE

## 2019-01-26 PROCEDURE — 81001 URINALYSIS AUTO W/SCOPE: CPT | Performed by: EMERGENCY MEDICINE

## 2019-01-26 PROCEDURE — 80076 HEPATIC FUNCTION PANEL: CPT | Performed by: EMERGENCY MEDICINE

## 2019-01-26 PROCEDURE — 96361 HYDRATE IV INFUSION ADD-ON: CPT

## 2019-01-26 PROCEDURE — 82553 CREATINE MB FRACTION: CPT | Performed by: EMERGENCY MEDICINE

## 2019-01-26 PROCEDURE — 80048 BASIC METABOLIC PNL TOTAL CA: CPT | Performed by: EMERGENCY MEDICINE

## 2019-01-26 PROCEDURE — 83690 ASSAY OF LIPASE: CPT | Performed by: EMERGENCY MEDICINE

## 2019-01-26 PROCEDURE — 82550 ASSAY OF CK (CPK): CPT | Performed by: EMERGENCY MEDICINE

## 2019-01-26 PROCEDURE — 85025 COMPLETE CBC W/AUTO DIFF WBC: CPT | Performed by: EMERGENCY MEDICINE

## 2019-01-26 PROCEDURE — 83735 ASSAY OF MAGNESIUM: CPT | Performed by: EMERGENCY MEDICINE

## 2019-01-26 PROCEDURE — 36415 COLL VENOUS BLD VENIPUNCTURE: CPT | Performed by: EMERGENCY MEDICINE

## 2019-01-26 PROCEDURE — 93005 ELECTROCARDIOGRAM TRACING: CPT

## 2019-01-26 PROCEDURE — 81025 URINE PREGNANCY TEST: CPT | Performed by: EMERGENCY MEDICINE

## 2019-01-26 PROCEDURE — 99285 EMERGENCY DEPT VISIT HI MDM: CPT

## 2019-01-26 PROCEDURE — 71275 CT ANGIOGRAPHY CHEST: CPT

## 2019-01-26 RX ORDER — LEVOFLOXACIN 500 MG/1
500 TABLET, FILM COATED ORAL DAILY
Qty: 7 TABLET | Refills: 0 | Status: SHIPPED | OUTPATIENT
Start: 2019-01-26 | End: 2019-02-02

## 2019-01-26 RX ORDER — IBUPROFEN 600 MG/1
600 TABLET ORAL ONCE
Status: COMPLETED | OUTPATIENT
Start: 2019-01-26 | End: 2019-01-26

## 2019-01-26 RX ORDER — NAPROXEN 500 MG/1
500 TABLET ORAL 2 TIMES DAILY WITH MEALS
Qty: 10 TABLET | Refills: 0 | Status: SHIPPED | OUTPATIENT
Start: 2019-01-26 | End: 2019-02-14

## 2019-01-26 RX ORDER — TRAMADOL HYDROCHLORIDE 50 MG/1
50 TABLET ORAL EVERY 6 HOURS PRN
Qty: 10 TABLET | Refills: 0 | Status: SHIPPED | OUTPATIENT
Start: 2019-01-26 | End: 2019-01-29

## 2019-01-26 RX ORDER — ACETAMINOPHEN 325 MG/1
650 TABLET ORAL ONCE
Status: COMPLETED | OUTPATIENT
Start: 2019-01-26 | End: 2019-01-26

## 2019-01-26 RX ADMIN — ACETAMINOPHEN 650 MG: 325 TABLET, FILM COATED ORAL at 13:24

## 2019-01-26 RX ADMIN — SODIUM CHLORIDE 1000 ML: 0.9 INJECTION, SOLUTION INTRAVENOUS at 13:14

## 2019-01-26 RX ADMIN — IOHEXOL 75 ML: 350 INJECTION, SOLUTION INTRAVENOUS at 14:35

## 2019-01-26 RX ADMIN — IBUPROFEN 600 MG: 600 TABLET, FILM COATED ORAL at 13:24

## 2019-01-26 NOTE — DISCHARGE INSTRUCTIONS
Acute Pericarditis   WHAT YOU NEED TO KNOW:   Acute pericarditis is inflammation of the pericardium  The pericardium is the thin sac that surrounds your heart  A small amount of clear fluid between the heart and the sac allows the heart to beat easily  With acute pericarditis, the amount of fluid increases and may contain pus  This can cause problems with the way that your heart beats  DISCHARGE INSTRUCTIONS:   Medicines:   · NSAIDs  help decrease swelling and pain or fever  This medicine is available with or without a doctor's order  NSAIDs can cause stomach bleeding or kidney problems in certain people  If you take blood thinner medicine, always ask your healthcare provider if NSAIDs are safe for you  Always read the medicine label and follow directions  · Antibiotics: This medicine will help fight or prevent an infection  Take your antibiotics until they are gone, even if you feel better  · Steroids: This medicine may be given to decrease redness, pain, and swelling  · Take your medicine as directed  Contact your healthcare provider if you think your medicine is not helping or if you have side effects  Tell him of her if you are allergic to any medicine  Keep a list of the medicines, vitamins, and herbs you take  Include the amounts, and when and why you take them  Bring the list or the pill bottles to follow-up visits  Carry your medicine list with you in case of an emergency  Follow up with your healthcare provider as directed:  Write down your questions so you remember to ask them during your visits  Wellness tips:   · Eat a variety of healthy foods: This may help you have more energy and heal faster  Healthy foods include fruit, vegetables, whole-grain breads, low-fat dairy products, beans, lean meat, and fish  Ask if you need to be on a special diet  · Drink liquids as directed: Adults should drink between 9 and 13 eight-ounce cups of liquid every day  Ask what amount is best for you  For most people, good liquids to drink are water, juice, and milk  · Get plenty of exercise:  Talk to your caregiver about the best exercise plan for you  Exercise can decrease your blood pressure and improve your health  · Do not smoke: If you smoke, it is never too late to quit  You are more likely to have heart disease, lung disease, cancer, and other health problems if you smoke  Quitting smoking will improve your health and the health of those around you  If you smoke, ask for information about how to stop  · Manage stress:  Stress may slow healing and cause illness  Learn new ways to relax, such as deep breathing  Contact your healthcare provider if:   · You have a fever  · You have questions or concerns about your condition or care  Return to the emergency department if:   · You have shortness of breath, which is worse when you lie down  · Your chest pain gets worse or does not get better  © 2017 [address] [employer] Information is for End User's use only and may not be sold, redistributed or otherwise used for commercial purposes  All illustrations and images included in CareNotes® are the copyrighted property of [employer]  or [employer]  The above information is an  only  It is not intended as medical advice for individual conditions or treatments  Talk to your doctor, nurse or pharmacist before following any medical regimen to see if it is safe and effective for you  Urinary Tract Infection in Women   WHAT YOU NEED TO KNOW:   A urinary tract infection (UTI) is caused by bacteria that get inside your urinary tract  Most bacteria that enter your urinary tract come out when you urinate  If the bacteria stay in your urinary tract, you may get an infection  Your urinary tract includes your kidneys, ureters, bladder, and urethra  Urine is made in your kidneys, and it flows from the ureters to the bladder   Urine leaves the bladder through the urethra  A UTI is more common in your lower urinary tract, which includes your bladder and urethra  DISCHARGE INSTRUCTIONS:   Return to the emergency department if:   · You are urinating very little or not at all  · You have a high fever with shaking chills  · You have side or back pain that gets worse  Contact your healthcare provider if:   · You have a fever  · You do not feel better after 2 days of taking antibiotics  · You are vomiting  · You have questions or concerns about your condition or care  Medicines:   · Antibiotics  help fight a bacterial infection  · Medicines  may be given to decrease pain and burning when you urinate  They will also help decrease the feeling that you need to urinate often  These medicines will make your urine orange or red  · Take your medicine as directed  Contact your healthcare provider if you think your medicine is not helping or if you have side effects  Tell him or her if you are allergic to any medicine  Keep a list of the medicines, vitamins, and herbs you take  Include the amounts, and when and why you take them  Bring the list or the pill bottles to follow-up visits  Carry your medicine list with you in case of an emergency  Follow up with your healthcare provider as directed:  Write down your questions so you remember to ask them during your visits  Prevent another UTI:   · Empty your bladder often  Urinate and empty your bladder as soon as you feel the need  Do not hold your urine for long periods of time  · Wipe from front to back after you urinate or have a bowel movement  This will help prevent germs from getting into your urinary tract through your urethra  · Drink liquids as directed  Ask how much liquid to drink each day and which liquids are best for you  You may need to drink more liquids than usual to help flush out the bacteria  Do not drink alcohol, caffeine, or citrus juices   These can irritate your bladder and increase your symptoms  Your healthcare provider may recommend cranberry juice to help prevent a UTI  · Urinate after you have sex  This can help flush out bacteria passed during sex  · Do not douche or use feminine deodorants  These can change the chemical balance in your vagina  · Change sanitary pads or tampons often  This will help prevent germs from getting into your urinary tract  · Do pelvic muscle exercises often  Pelvic muscle exercises may help you start and stop urinating  Strong pelvic muscles may help you empty your bladder easier  Squeeze these muscles tightly for 5 seconds like you are trying to hold back urine  Then relax for 5 seconds  Gradually work up to squeezing for 10 seconds  Do 3 sets of 15 repetitions a day, or as directed  © 2017 [address] [facility] Information is for End User's use only and may not be sold, redistributed or otherwise used for commercial purposes  All illustrations and images included in CareNotes® are the copyrighted property of [employer]  or [employer] Buck  The above information is an  only  It is not intended as medical advice for individual conditions or treatments  Talk to your doctor, nurse or pharmacist before following any medical regimen to see if it is safe and effective for you

## 2019-01-28 ENCOUNTER — TELEPHONE (OUTPATIENT)
Dept: FAMILY MEDICINE CLINIC | Facility: CLINIC | Age: 24
End: 2019-01-28

## 2019-01-28 LAB
ATRIAL RATE: 114 BPM
P AXIS: 70 DEGREES
PR INTERVAL: 158 MS
QRS AXIS: 69 DEGREES
QRSD INTERVAL: 82 MS
QT INTERVAL: 310 MS
QTC INTERVAL: 427 MS
T WAVE AXIS: 42 DEGREES
VENTRICULAR RATE: 114 BPM

## 2019-01-28 PROCEDURE — 93010 ELECTROCARDIOGRAM REPORT: CPT | Performed by: INTERNAL MEDICINE

## 2019-01-28 NOTE — TELEPHONE ENCOUNTER
Dr Serrano Six the med naproxen made pt stomach upset and burning so pt stop meds pls advise what to do

## 2019-02-12 ENCOUNTER — HOSPITAL ENCOUNTER (OUTPATIENT)
Dept: NON INVASIVE DIAGNOSTICS | Facility: HOSPITAL | Age: 24
Discharge: HOME | End: 2019-02-12
Attending: EMERGENCY MEDICINE
Payer: COMMERCIAL

## 2019-02-12 DIAGNOSIS — R09.1 PLEURISY: ICD-10-CM

## 2019-02-12 DIAGNOSIS — N39.0 UTI (URINARY TRACT INFECTION): ICD-10-CM

## 2019-02-12 PROCEDURE — 93306 TTE W/DOPPLER COMPLETE: CPT | Performed by: INTERNAL MEDICINE

## 2019-02-12 PROCEDURE — 93306 TTE W/DOPPLER COMPLETE: CPT

## 2019-02-14 ENCOUNTER — OFFICE VISIT (OUTPATIENT)
Dept: FAMILY MEDICINE CLINIC | Facility: CLINIC | Age: 24
End: 2019-02-14
Payer: COMMERCIAL

## 2019-02-14 VITALS
HEIGHT: 65 IN | DIASTOLIC BLOOD PRESSURE: 60 MMHG | WEIGHT: 139 LBS | RESPIRATION RATE: 16 BRPM | BODY MASS INDEX: 23.16 KG/M2 | HEART RATE: 88 BPM | SYSTOLIC BLOOD PRESSURE: 110 MMHG | TEMPERATURE: 97.9 F

## 2019-02-14 DIAGNOSIS — I82.512 CHRONIC DEEP VEIN THROMBOSIS (DVT) OF FEMORAL VEIN OF LEFT LOWER EXTREMITY (HCC): ICD-10-CM

## 2019-02-14 DIAGNOSIS — R00.2 PALPITATIONS: Primary | ICD-10-CM

## 2019-02-14 DIAGNOSIS — R30.0 DYSURIA: ICD-10-CM

## 2019-02-14 LAB
SL AMB  POCT GLUCOSE, UA: ABNORMAL
SL AMB LEUKOCYTE ESTERASE,UA: 75
SL AMB POCT BILIRUBIN,UA: ABNORMAL
SL AMB POCT BLOOD,UA: 250
SL AMB POCT CLARITY,UA: ABNORMAL
SL AMB POCT COLOR,UA: YELLOW
SL AMB POCT KETONES,UA: ABNORMAL
SL AMB POCT NITRITE,UA: ABNORMAL
SL AMB POCT PH,UA: 5
SL AMB POCT SPECIFIC GRAVITY,UA: 1.02
SL AMB POCT URINE PROTEIN: 100
SL AMB POCT UROBILINOGEN: ABNORMAL

## 2019-02-14 PROCEDURE — 3008F BODY MASS INDEX DOCD: CPT | Performed by: FAMILY MEDICINE

## 2019-02-14 PROCEDURE — 99214 OFFICE O/P EST MOD 30 MIN: CPT | Performed by: FAMILY MEDICINE

## 2019-02-14 PROCEDURE — 81003 URINALYSIS AUTO W/O SCOPE: CPT | Performed by: FAMILY MEDICINE

## 2019-02-14 NOTE — PROGRESS NOTES
Chief Complaint   Patient presents with    Follow-up     ER        Patient ID: Majorie Boxer is a 26 y o  female  HPI   pt is seeing for f/u ER visit 2 wks ago for Sob and tachycardia -  Had fever in ER and was Dx with UTI -  Treated  -  CTA negative for PE -  Was sent for ECHO  -  Normal-  Feeling better but still palpitations when lying down  -  Cont to have L leg swelling with mild activities -  Did not see vascular surgeon recently  -  Has chronic DVT in L leg  -  Last doppler 6 m ago     The following portions of the patient's history were reviewed and updated as appropriate: allergies, current medications, past family history, past medical history, past social history, past surgical history and problem list     Review of Systems   Constitutional: Negative  HENT: Negative  Respiratory: Negative  Cardiovascular: Positive for palpitations and leg swelling  Negative for chest pain  Gastrointestinal: Negative  Genitourinary: Negative  Musculoskeletal: Negative  Psychiatric/Behavioral: Negative  Current Outpatient Medications   Medication Sig Dispense Refill     No current facility-administered medications for this visit  Objective:    /60 (BP Location: Left arm, Patient Position: Sitting, Cuff Size: Standard)   Pulse 88   Temp 97 9 °F (36 6 °C) (Tympanic)   Resp 16   Ht 5' 5" (1 651 m)   Wt 63 kg (139 lb)   BMI 23 13 kg/m²        Physical Exam   Constitutional: No distress  Cardiovascular: Normal rate, regular rhythm and normal heart sounds  No murmur heard  Pulmonary/Chest: Effort normal and breath sounds normal  No stridor  No respiratory distress  She has no wheezes  She has no rales  Abdominal: Soft  Assessment/Plan:         Diagnoses and all orders for this visit:    Palpitations  Related to recent viral infection?    Dysuria  -     POCT urine dip auto non-scope -  UA is similar as in ER-  Pt is asymptomatic now -  Will wait for C&S   - Urine culture; Future    Chronic deep vein thrombosis (DVT) of femoral vein of left lower extremity ([facility] [address] )  -     Ambulatory referral to Vascular Surgery;  Future      rto prn                       George Yang MD

## 2019-02-18 ENCOUNTER — TELEPHONE (OUTPATIENT)
Dept: FAMILY MEDICINE CLINIC | Facility: CLINIC | Age: 24
End: 2019-02-18

## 2019-02-18 LAB
BACTERIA UR CULT: NORMAL
Lab: NO GROWTH

## 2019-02-18 NOTE — TELEPHONE ENCOUNTER
----- Message from Reema Garcia MD sent at 2/18/2019 12:23 PM EST -----  Pl, advise pt -  Normal urine culture    Results mailed

## 2019-03-14 NOTE — PROGRESS NOTES
Assessment/Plan:    Chronic deep vein thrombosis (DVT) of femoral vein of left lower extremity [facility])  27 y/o thin female with hx of iliac/femoral vein DVT and bilateral PE in May 2018, anticoagulated with Eliquis until Nov 2018  She has been evaluated by hematology, thrombophilia panel with only abnormality being a slightly elevated antiphosphatidylserine IgM that was decreased from initial test and only slightly above normal   Oral contraceptives were thought to contribute to her blood clot, now using IUD, and Eliquis was discontinued in November  Repeat LEV w/ chronic iliac vein DVT  She continues to have swelling and heaviness at end of day  We discussed the pathophysiology of her symptoms related to her chronic DVT, as well as possibility of May Thurner syndrome contributing to her DVT  Advised use of compression and elevation to help manage symptoms  If she develops recurrent DVT would recommend lifelong anticoagulation at that time  If her baseline LLE symptoms were to worsen could consider diagnostic venogram for May Thurner and possible iliac stenting  Followup as needed  Diagnoses and all orders for this visit:    Chronic deep vein thrombosis (DVT) of femoral vein of left lower extremity ([address] )  -     Ambulatory referral to Vascular Surgery          Subjective:      Patient ID: Kayy Bragg is a 26 y o  female  Patient referred by PCP for followup of LLE DVT  In May 2018 patient developed acute left iliac/fem vein DVT and bilateral PE  Repeat LEV 8/17/19 with chronic iliac/CFV DVT  She has been evaluated by hematology with only slight abnormality in thrombophilia panel  Birth control was believed to have contributed to her DVT, now using IUD  Due to heavy periods with anemia, slightly abnormal antiphospholipid on blood work and chronic nature of DVT Eliquis was discontinued in November 2018 by hematology  Patient has remained off of AC since that time with no issue    Patient denies any family history of clotting disorders  She has been experiencing intermittent LLE swelling, worse at end of day, associated with heaviness and tiredness  She has compression stockings and wears them occasionally  She elevates infrequently  She denies any CP/SOB  Pt is new to our practice and was referred by Dr George Yang MD   Pt is here to due to a history of DVT May 15, 2018  Pt had a LEV on 8/17/19  Pt gets painful swelling of her left leg while walking  Pt at times wears compression stockings as directed  Pt does elevate her legs when possible  Pt also had a PE at the time of the DVT  Pt is not currently taking any blood thinners  The following portions of the patient's history were reviewed and updated as appropriate: allergies, current medications, past family history, past medical history, past social history, past surgical history and problem list     Review of Systems   Constitutional: Positive for fatigue  HENT: Negative  Eyes: Negative  Respiratory: Positive for shortness of breath (With activity)  Cardiovascular: Positive for palpitations and leg swelling (Left leg)  Gastrointestinal: Negative  Endocrine: Negative  Genitourinary: Negative  Musculoskeletal:        Leg pain   Skin: Negative  Allergic/Immunologic: Positive for food allergies (Strawberries)  Neurological: Positive for weakness  Hematological: Negative  Psychiatric/Behavioral: Negative  Objective:       Physical Exam   Constitutional: She is oriented to person, place, and time  She appears well-nourished  No distress  HENT:   Head: Normocephalic and atraumatic  Eyes: No scleral icterus  Neck: Neck supple  No JVD present  Cardiovascular: Normal rate, regular rhythm and normal heart sounds  No murmur heard  Pulses:       Dorsalis pedis pulses are 2+ on the right side, and 2+ on the left side     Pulmonary/Chest: Effort normal and breath sounds normal  Abdominal: Soft  She exhibits no distension  There is no tenderness  Musculoskeletal: She exhibits no edema  Neurological: She is alert and oriented to person, place, and time  Skin: Skin is warm and dry  Psychiatric: She has a normal mood and affect  I have reviewed and made appropriate changes to the review of systems input by the medical assistant      Vitals:    03/15/19 1054   BP: 108/58   BP Location: Left arm   Patient Position: Sitting   Cuff Size: Adult   Pulse: 60   Resp: 16   Temp: (!) 97 3 °F (36 3 °C)   TempSrc: Tympanic   Weight: 61 2 kg (135 lb)   Height: 5' 5" (1 651 m)       Patient Active Problem List   Diagnosis    Pulmonary emboli (HCC)    Chronic deep vein thrombosis (DVT) of femoral vein of left lower extremity (HCC)       Past Surgical History:   Procedure Laterality Date    NO PAST SURGERIES         Family History   Problem Relation Age of Onset    No Known Problems Mother     No Known Problems Father     Cancer Maternal Grandmother     Hypertension Maternal Grandmother        Social History     Socioeconomic History    Marital status: Single     Spouse name: Not on file    Number of children: Not on file    Years of education: Not on file    Highest education level: Not on file   Occupational History    Not on file   Social Needs    Financial resource strain: Not on file    Food insecurity:     Worry: Not on file     Inability: Not on file    Transportation needs:     Medical: Not on file     Non-medical: Not on file   Tobacco Use    Smoking status: Never Smoker    Smokeless tobacco: Never Used   Substance and Sexual Activity    Alcohol use: Yes     Comment: socially    Drug use: No    Sexual activity: Yes     Comment: birth control pills    Lifestyle    Physical activity:     Days per week: Not on file     Minutes per session: Not on file    Stress: Not on file   Relationships    Social connections:     Talks on phone: Not on file     Gets together: Not on file     Attends Protestant service: Not on file     Active member of club or organization: Not on file     Attends meetings of clubs or organizations: Not on file     Relationship status: Not on file    Intimate partner violence:     Fear of current or ex partner: Not on file     Emotionally abused: Not on file     Physically abused: Not on file     Forced sexual activity: Not on file   Other Topics Concern    Not on file   Social History Narrative    Not on file       Allergies   Allergen Reactions    Other Hives     Strawberries       No current outpatient medications on file

## 2019-03-15 ENCOUNTER — CONSULT (OUTPATIENT)
Dept: VASCULAR SURGERY | Facility: CLINIC | Age: 24
End: 2019-03-15
Payer: COMMERCIAL

## 2019-03-15 VITALS
HEIGHT: 65 IN | SYSTOLIC BLOOD PRESSURE: 108 MMHG | WEIGHT: 135 LBS | RESPIRATION RATE: 16 BRPM | BODY MASS INDEX: 22.49 KG/M2 | TEMPERATURE: 97.3 F | DIASTOLIC BLOOD PRESSURE: 58 MMHG | HEART RATE: 60 BPM

## 2019-03-15 DIAGNOSIS — I82.512 CHRONIC DEEP VEIN THROMBOSIS (DVT) OF FEMORAL VEIN OF LEFT LOWER EXTREMITY (HCC): ICD-10-CM

## 2019-03-15 PROCEDURE — 99243 OFF/OP CNSLTJ NEW/EST LOW 30: CPT | Performed by: NURSE PRACTITIONER

## 2019-03-15 NOTE — ASSESSMENT & PLAN NOTE
25 y/o thin female with hx of iliac/femoral vein DVT and bilateral PE in May 2018, anticoagulated with Eliquis until Nov 2018  She has been evaluated by hematology, thrombophilia panel with only abnormality being a slightly elevated antiphosphatidylserine IgM that was decreased from initial test and only slightly above normal   Oral contraceptives were thought to contribute to her blood clot, now using IUD, and Eliquis was discontinued in November  Repeat LEV w/ chronic iliac vein DVT  She continues to have swelling and heaviness at end of day  We discussed the pathophysiology of her symptoms related to her chronic DVT, as well as possibility of May Thurner syndrome contributing to her DVT  Advised use of compression and elevation to help manage symptoms  If she develops recurrent DVT would recommend lifelong anticoagulation at that time  If her baseline LLE symptoms were to worsen could consider diagnostic venogram for May Thurner and possible iliac stenting  Followup as needed

## 2019-03-15 NOTE — PATIENT INSTRUCTIONS
Chronic left iliac vein DVT/blood clot, possible element of May Thurner syndrome  -at present there is no clear cause of your blood clot  It could have been birth control induced or there may be an element of May Thurner syndrome which contributed to your iliac vein clot or a combination of both  -the clot is now chronic and there is no need for long-term anticoagulation at this point    However, if you were to develop a recurrent blood clot I would recommend lifelong anticoagulation at that time  -things we discussed that can help manage lower extremity swelling and pain are periodic elevation, regular physical activity, daily use of knee-high compression stockings (or at least when your standing long periods of time or sitting long periods time), and maintenance of a healthy weight  -if you develop any recurrent signs or symptoms of blood clot to include significant pain or persistent unchanged swelling notify our office and a venous duplex will be obtained at that time  -follow-up as needed

## 2019-04-13 NOTE — TELEPHONE ENCOUNTER
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

## 2019-04-23 ENCOUNTER — OFFICE VISIT (OUTPATIENT)
Dept: FAMILY MEDICINE CLINIC | Facility: CLINIC | Age: 24
End: 2019-04-23
Payer: COMMERCIAL

## 2019-04-23 VITALS
RESPIRATION RATE: 12 BRPM | SYSTOLIC BLOOD PRESSURE: 98 MMHG | WEIGHT: 134 LBS | BODY MASS INDEX: 22.33 KG/M2 | TEMPERATURE: 97.5 F | HEART RATE: 60 BPM | HEIGHT: 65 IN | DIASTOLIC BLOOD PRESSURE: 60 MMHG

## 2019-04-23 DIAGNOSIS — R19.09 GROIN LUMP: Primary | ICD-10-CM

## 2019-04-23 PROCEDURE — 1036F TOBACCO NON-USER: CPT | Performed by: FAMILY MEDICINE

## 2019-04-23 PROCEDURE — 3725F SCREEN DEPRESSION PERFORMED: CPT | Performed by: FAMILY MEDICINE

## 2019-04-23 PROCEDURE — 3008F BODY MASS INDEX DOCD: CPT | Performed by: FAMILY MEDICINE

## 2019-04-23 PROCEDURE — 99213 OFFICE O/P EST LOW 20 MIN: CPT | Performed by: FAMILY MEDICINE

## 2019-07-31 ENCOUNTER — TELEPHONE (OUTPATIENT)
Dept: OTHER | Facility: OTHER | Age: 24
End: 2019-07-31

## 2019-08-01 NOTE — TELEPHONE ENCOUNTER
Called patient  She needs referral for Dr Racquel Wilkins in [facility]  Did referral in [address] [place] [address]

## 2020-03-04 ENCOUNTER — OFFICE VISIT (OUTPATIENT)
Dept: URGENT CARE | Facility: CLINIC | Age: 25
End: 2020-03-04
Payer: COMMERCIAL

## 2020-03-04 VITALS
TEMPERATURE: 99 F | DIASTOLIC BLOOD PRESSURE: 64 MMHG | SYSTOLIC BLOOD PRESSURE: 102 MMHG | WEIGHT: 128 LBS | HEART RATE: 64 BPM | OXYGEN SATURATION: 100 % | RESPIRATION RATE: 16 BRPM | BODY MASS INDEX: 21.3 KG/M2

## 2020-03-04 DIAGNOSIS — J02.9 SORE THROAT: Primary | ICD-10-CM

## 2020-03-04 DIAGNOSIS — J01.00 ACUTE MAXILLARY SINUSITIS, RECURRENCE NOT SPECIFIED: ICD-10-CM

## 2020-03-04 LAB — S PYO AG THROAT QL: NEGATIVE

## 2020-03-04 PROCEDURE — 87880 STREP A ASSAY W/OPTIC: CPT | Performed by: PREVENTIVE MEDICINE

## 2020-03-04 PROCEDURE — 1036F TOBACCO NON-USER: CPT | Performed by: PREVENTIVE MEDICINE

## 2020-03-04 PROCEDURE — 99213 OFFICE O/P EST LOW 20 MIN: CPT | Performed by: PREVENTIVE MEDICINE

## 2020-03-04 RX ORDER — AMOXICILLIN AND CLAVULANATE POTASSIUM 875; 125 MG/1; MG/1
1 TABLET, FILM COATED ORAL EVERY 12 HOURS SCHEDULED
Qty: 14 TABLET | Refills: 0 | Status: SHIPPED | OUTPATIENT
Start: 2020-03-04 | End: 2020-03-11

## 2020-03-04 NOTE — PATIENT INSTRUCTIONS
Sinusitis   AMBULATORY CARE:   Sinusitis  is inflammation or infection of your sinuses  It is most often caused by a virus  Acute sinusitis may last up to 12 weeks  Chronic sinusitis lasts longer than 12 weeks  Recurrent sinusitis means you have 4 or more times in 1 year  Common symptoms include the following:   · Fever    · Pain, pressure, redness, or swelling around the forehead, cheeks, or eyes    · Thick yellow or green discharge from your nose    · Tenderness when you touch your face over your sinuses    · Dry cough that happens mostly at night or when you lie down    · Headache and face pain that is worse when you lean forward    · Tooth pain, or pain when you chew  Seek care immediately if:   · Your eye and eyelid are red, swollen, and painful  · You cannot open your eye  · You have vision changes, such as double vision  · Your eyeball bulges out or you cannot move your eye  · You are more sleepy than normal, or you notice changes in your ability to think, move, or talk  · You have a stiff neck, a fever, or a bad headache  · You have swelling of your forehead or scalp  Contact your healthcare provider if:   · Your symptoms do not improve after 3 days  · Your symptoms do not go away after 10 days  · You have nausea and are vomiting  · Your nose is bleeding  · You have questions or concerns about your condition or care  Treatment for sinusitis:  Your symptoms may go away on their own  Your healthcare provider may recommend watchful waiting for up to 10 days before starting antibiotics  You may  need any of the following:  · Acetaminophen  decreases pain and fever  It is available without a doctor's order  Ask how much to take and how often to take it  Follow directions  Read the labels of all other medicines you are using to see if they also contain acetaminophen, or ask your doctor or pharmacist  Acetaminophen can cause liver damage if not taken correctly   Do not use more than 4 grams (4,000 milligrams) total of acetaminophen in one day  · NSAIDs , such as ibuprofen, help decrease swelling, pain, and fever  This medicine is available with or without a doctor's order  NSAIDs can cause stomach bleeding or kidney problems in certain people  If you take blood thinner medicine, always ask your healthcare provider if NSAIDs are safe for you  Always read the medicine label and follow directions  · Nasal steroid sprays  may help decrease inflammation in your nose and sinuses  · Decongestants  help reduce swelling and drain mucus in the nose and sinuses  They may help you breathe easier  · Antihistamines  help dry mucus in the nose and relieve sneezing  · Antibiotics  help treat or prevent a bacterial infection  · Take your medicine as directed  Contact your healthcare provider if you think your medicine is not helping or if you have side effects  Tell him or her if you are allergic to any medicine  Keep a list of the medicines, vitamins, and herbs you take  Include the amounts, and when and why you take them  Bring the list or the pill bottles to follow-up visits  Carry your medicine list with you in case of an emergency  Self-care:   · Rinse your sinuses  Use a sinus rinse device to rinse your nasal passages with a saline (salt water) solution or distilled water  Do not use tap water  This will help thin the mucus in your nose and rinse away pollen and dirt  It will also help reduce swelling so you can breathe normally  Ask your healthcare provider how often to do this  · Breathe in steam   Heat a bowl of water until you see steam  Lean over the bowl and make a tent over your head with a large towel  Breathe deeply for about 20 minutes  Be careful not to get too close to the steam or burn yourself  Do this 3 times a day  You can also breathe deeply when you take a hot shower  · Sleep with your head elevated    Place an extra pillow under your head before you go to sleep to help your sinuses drain  · Drink liquids as directed  Ask your healthcare provider how much liquid to drink each day and which liquids are best for you  Liquids will thin the mucus in your nose and help it drain  Avoid drinks that contain alcohol or caffeine  · Do not smoke, and avoid secondhand smoke  Nicotine and other chemicals in cigarettes and cigars can make your symptoms worse  Ask your healthcare provider for information if you currently smoke and need help to quit  E-cigarettes or smokeless tobacco still contain nicotine  Talk to your healthcare provider before you use these products  Prevent the spread of germs that cause sinusitis:  Wash your hands often with soap and water  Wash your hands after you use the bathroom, change a child's diaper, or sneeze  Wash your hands before you prepare or eat food  Follow up with your healthcare provider as directed: You may be referred to an ear, nose, and throat specialist  Write down your questions so you remember to ask them during your visits  © 2017 [address] [facility] Information is for End User's use only and may not be sold, redistributed or otherwise used for commercial purposes  All illustrations and images included in CareNotes® are the copyrighted property of [employer]  or [employer]  The above information is an  only  It is not intended as medical advice for individual conditions or treatments  Talk to your doctor, nurse or pharmacist before following any medical regimen to see if it is safe and effective for you

## 2020-03-06 NOTE — PROGRESS NOTES
[facility]        NAME: Carlos Samaniego is a 27 y o  female  : 1995    MRN: [id number]  DATE: 2020  TIME: 1:26 PM    Assessment and Plan   Sore throat [J02 9]  1  Sore throat  POCT rapid strepA   2  Acute maxillary sinusitis, recurrence not specified  amoxicillin-clavulanate (AUGMENTIN) 875-125 mg per tablet         Patient Instructions       Follow up with PCP in 3-5 days  Proceed to  ER if symptoms worsen  Chief Complaint     Chief Complaint   Patient presents with    Sore Throat     since thursday has had a cough, sore throat nasal congestion  History of Present Illness       Sore Throat    This is a new problem  The current episode started in the past 7 days  The problem has been unchanged  There has been no fever  The pain is at a severity of 4/10  The pain is moderate  Associated symptoms include congestion, coughing, a hoarse voice and swollen glands  She has tried nothing for the symptoms  The treatment provided no relief  Review of Systems   Review of Systems   HENT: Positive for congestion, hoarse voice and sore throat  Eyes: Negative  Respiratory: Positive for cough  Cardiovascular: Negative  All other systems reviewed and are negative          Current Medications       Current Outpatient Medications:     rivaroxaban (Xarelto) 20 mg tablet, Take 20 mg by mouth, Disp: , Rfl:     amoxicillin-clavulanate (AUGMENTIN) 875-125 mg per tablet, Take 1 tablet by mouth every 12 (twelve) hours for 7 days, Disp: 14 tablet, Rfl: 0    Current Allergies     Allergies as of 2020 - Reviewed 2020   Allergen Reaction Noted    Other Hives 03/15/2019            The following portions of the patient's history were reviewed and updated as appropriate: allergies, current medications, past family history, past medical history, past social history, past surgical history and problem list      Past Medical History:   Diagnosis Date    Depression with anxiety 01/07/2012    [facility])     Headache 01/07/2012    Pulmonary embolism ([facility])     Raynaud disease     TMJ arthralgia     last assessed 04/29/2015       Past Surgical History:   Procedure Laterality Date    NO PAST SURGERIES         Family History   Problem Relation Age of Onset    No Known Problems Mother     No Known Problems Father     Cancer Maternal Grandmother     Hypertension Maternal Grandmother          Medications have been verified  Objective   /64   Pulse 64   Temp 99 °F (37 2 °C)   Resp 16   Wt 58 1 kg (128 lb)   SpO2 100%   BMI 21 30 kg/m²        Physical Exam     Physical Exam   Constitutional: She appears well-developed and well-nourished  HENT:   Head: Normocephalic  Right Ear: External ear normal    Left Ear: External ear normal    Nose: Mucosal edema and rhinorrhea present  Mouth/Throat: Posterior oropharyngeal erythema present  No oropharyngeal exudate or posterior oropharyngeal edema  Tonsils are 1+ on the right  Tonsils are 1+ on the left  Neck: Normal range of motion  Cardiovascular: Normal rate, regular rhythm and normal heart sounds  Pulmonary/Chest: Effort normal  She has no wheezes  Lymphadenopathy:     She has no cervical adenopathy  Skin: Skin is warm  No rash noted  No pallor  Nursing note and vitals reviewed

## 2020-05-05 ENCOUNTER — APPOINTMENT (EMERGENCY)
Dept: RADIOLOGY | Facility: HOSPITAL | Age: 25
End: 2020-05-05
Payer: COMMERCIAL

## 2020-05-05 ENCOUNTER — TELEPHONE (OUTPATIENT)
Dept: FAMILY MEDICINE CLINIC | Facility: CLINIC | Age: 25
End: 2020-05-05

## 2020-05-05 ENCOUNTER — HOSPITAL ENCOUNTER (EMERGENCY)
Facility: HOSPITAL | Age: 25
Discharge: HOME | End: 2020-05-05
Attending: EMERGENCY MEDICINE | Admitting: EMERGENCY MEDICINE
Payer: COMMERCIAL

## 2020-05-05 VITALS
RESPIRATION RATE: 14 BRPM | WEIGHT: 130 LBS | TEMPERATURE: 98.7 F | BODY MASS INDEX: 21.66 KG/M2 | SYSTOLIC BLOOD PRESSURE: 128 MMHG | HEART RATE: 84 BPM | OXYGEN SATURATION: 97 % | DIASTOLIC BLOOD PRESSURE: 58 MMHG | HEIGHT: 65 IN

## 2020-05-05 DIAGNOSIS — R06.00 DYSPNEA, UNSPECIFIED TYPE: Primary | ICD-10-CM

## 2020-05-05 LAB
ALBUMIN SERPL BCP-MCNC: 4.1 G/DL (ref 3.5–5)
ALP SERPL-CCNC: 70 U/L (ref 46–116)
ALT SERPL W P-5'-P-CCNC: 22 U/L (ref 12–78)
AMPHETAMINES SERPL QL SCN: NEGATIVE
ANION GAP SERPL CALCULATED.3IONS-SCNC: 10 MMOL/L (ref 4–13)
APTT PPP: 41 SECONDS (ref 23–37)
AST SERPL W P-5'-P-CCNC: 15 U/L (ref 5–45)
BARBITURATES UR QL: NEGATIVE
BASOPHILS # BLD AUTO: 0.05 THOUSANDS/ΜL (ref 0–0.1)
BASOPHILS NFR BLD AUTO: 1 % (ref 0–1)
BENZODIAZ UR QL: NEGATIVE
BILIRUB SERPL-MCNC: 0.3 MG/DL (ref 0.2–1)
BUN SERPL-MCNC: 13 MG/DL (ref 5–25)
CALCIUM SERPL-MCNC: 9.4 MG/DL (ref 8.3–10.1)
CHLORIDE SERPL-SCNC: 104 MMOL/L (ref 100–108)
CO2 SERPL-SCNC: 26 MMOL/L (ref 21–32)
COCAINE UR QL: NEGATIVE
CREAT SERPL-MCNC: 1.03 MG/DL (ref 0.6–1.3)
D DIMER PPP FEU-MCNC: 2.81 UG/ML FEU
EOSINOPHIL # BLD AUTO: 0.22 THOUSAND/ΜL (ref 0–0.61)
EOSINOPHIL NFR BLD AUTO: 2 % (ref 0–6)
ERYTHROCYTE [DISTWIDTH] IN BLOOD BY AUTOMATED COUNT: 11.4 % (ref 11.6–15.1)
EXT PREG TEST URINE: NEGATIVE
EXT. CONTROL ED NAV: NORMAL
GFR SERPL CREATININE-BSD FRML MDRD: 77 ML/MIN/1.73SQ M
GLUCOSE SERPL-MCNC: 93 MG/DL (ref 65–140)
HCT VFR BLD AUTO: 40.5 % (ref 34.8–46.1)
HGB BLD-MCNC: 14.1 G/DL (ref 11.5–15.4)
HOLD SPECIMEN: NORMAL
IMM GRANULOCYTES # BLD AUTO: 0.02 THOUSAND/UL (ref 0–0.2)
IMM GRANULOCYTES NFR BLD AUTO: 0 % (ref 0–2)
INR PPP: 1.37 (ref 0.84–1.19)
LYMPHOCYTES # BLD AUTO: 2.76 THOUSANDS/ΜL (ref 0.6–4.47)
LYMPHOCYTES NFR BLD AUTO: 27 % (ref 14–44)
MCH RBC QN AUTO: 31.8 PG (ref 26.8–34.3)
MCHC RBC AUTO-ENTMCNC: 34.8 G/DL (ref 31.4–37.4)
MCV RBC AUTO: 91 FL (ref 82–98)
METHADONE UR QL: NEGATIVE
MONOCYTES # BLD AUTO: 0.88 THOUSAND/ΜL (ref 0.17–1.22)
MONOCYTES NFR BLD AUTO: 9 % (ref 4–12)
NEUTROPHILS # BLD AUTO: 6.13 THOUSANDS/ΜL (ref 1.85–7.62)
NEUTS SEG NFR BLD AUTO: 61 % (ref 43–75)
NRBC BLD AUTO-RTO: 0 /100 WBCS
OPIATES UR QL SCN: NEGATIVE
PCP UR QL: NEGATIVE
PLATELET # BLD AUTO: 293 THOUSANDS/UL (ref 149–390)
PMV BLD AUTO: 11.1 FL (ref 8.9–12.7)
POTASSIUM SERPL-SCNC: 3.8 MMOL/L (ref 3.5–5.3)
PROT SERPL-MCNC: 7 G/DL (ref 6.4–8.2)
PROTHROMBIN TIME: 17.3 SECONDS (ref 11.6–14.5)
RBC # BLD AUTO: 4.44 MILLION/UL (ref 3.81–5.12)
SODIUM SERPL-SCNC: 140 MMOL/L (ref 136–145)
THC UR QL: NEGATIVE
TROPONIN I SERPL-MCNC: <0.02 NG/ML
WBC # BLD AUTO: 10.06 THOUSAND/UL (ref 4.31–10.16)

## 2020-05-05 PROCEDURE — 85610 PROTHROMBIN TIME: CPT | Performed by: EMERGENCY MEDICINE

## 2020-05-05 PROCEDURE — 99285 EMERGENCY DEPT VISIT HI MDM: CPT

## 2020-05-05 PROCEDURE — 85025 COMPLETE CBC W/AUTO DIFF WBC: CPT | Performed by: EMERGENCY MEDICINE

## 2020-05-05 PROCEDURE — 85730 THROMBOPLASTIN TIME PARTIAL: CPT | Performed by: EMERGENCY MEDICINE

## 2020-05-05 PROCEDURE — 84484 ASSAY OF TROPONIN QUANT: CPT | Performed by: EMERGENCY MEDICINE

## 2020-05-05 PROCEDURE — 93005 ELECTROCARDIOGRAM TRACING: CPT

## 2020-05-05 PROCEDURE — 36415 COLL VENOUS BLD VENIPUNCTURE: CPT | Performed by: EMERGENCY MEDICINE

## 2020-05-05 PROCEDURE — 96360 HYDRATION IV INFUSION INIT: CPT

## 2020-05-05 PROCEDURE — 80053 COMPREHEN METABOLIC PANEL: CPT | Performed by: EMERGENCY MEDICINE

## 2020-05-05 PROCEDURE — 81025 URINE PREGNANCY TEST: CPT | Performed by: EMERGENCY MEDICINE

## 2020-05-05 PROCEDURE — 80307 DRUG TEST PRSMV CHEM ANLYZR: CPT | Performed by: EMERGENCY MEDICINE

## 2020-05-05 PROCEDURE — 71275 CT ANGIOGRAPHY CHEST: CPT

## 2020-05-05 PROCEDURE — 85379 FIBRIN DEGRADATION QUANT: CPT | Performed by: EMERGENCY MEDICINE

## 2020-05-05 PROCEDURE — 99284 EMERGENCY DEPT VISIT MOD MDM: CPT | Performed by: EMERGENCY MEDICINE

## 2020-05-05 RX ADMIN — IODIXANOL 85 ML: 320 INJECTION, SOLUTION INTRAVASCULAR at 18:50

## 2020-05-05 RX ADMIN — SODIUM CHLORIDE 1000 ML: 0.9 INJECTION, SOLUTION INTRAVENOUS at 18:00

## 2020-05-06 ENCOUNTER — VBI (OUTPATIENT)
Dept: FAMILY MEDICINE CLINIC | Facility: CLINIC | Age: 25
End: 2020-05-06

## 2020-05-06 LAB
ATRIAL RATE: 90 BPM
P AXIS: 76 DEGREES
PR INTERVAL: 154 MS
QRS AXIS: 75 DEGREES
QRSD INTERVAL: 86 MS
QT INTERVAL: 362 MS
QTC INTERVAL: 442 MS
T WAVE AXIS: 67 DEGREES
VENTRICULAR RATE: 90 BPM

## 2020-05-06 PROCEDURE — 93010 ELECTROCARDIOGRAM REPORT: CPT | Performed by: INTERNAL MEDICINE

## 2020-05-08 ENCOUNTER — TELEPHONE (OUTPATIENT)
Dept: FAMILY MEDICINE CLINIC | Facility: CLINIC | Age: 25
End: 2020-05-08

## 2020-05-08 ENCOUNTER — TELEMEDICINE (OUTPATIENT)
Dept: FAMILY MEDICINE CLINIC | Facility: CLINIC | Age: 25
End: 2020-05-08
Payer: COMMERCIAL

## 2020-05-08 DIAGNOSIS — R06.00 DYSPNEA ON EXERTION: ICD-10-CM

## 2020-05-08 DIAGNOSIS — I82.512 CHRONIC DEEP VEIN THROMBOSIS (DVT) OF FEMORAL VEIN OF LEFT LOWER EXTREMITY (HCC): Primary | ICD-10-CM

## 2020-05-08 DIAGNOSIS — M79.89 LEFT LEG SWELLING: ICD-10-CM

## 2020-05-08 DIAGNOSIS — I82.512 CHRONIC DEEP VEIN THROMBOSIS (DVT) OF FEMORAL VEIN OF LEFT LOWER EXTREMITY (HCC): ICD-10-CM

## 2020-05-08 DIAGNOSIS — M79.89 LEFT LEG SWELLING: Primary | ICD-10-CM

## 2020-05-08 PROCEDURE — 99214 OFFICE O/P EST MOD 30 MIN: CPT | Performed by: FAMILY MEDICINE

## 2020-05-16 ENCOUNTER — TELEMEDICINE (OUTPATIENT)
Dept: FAMILY MEDICINE CLINIC | Facility: CLINIC | Age: 25
End: 2020-05-16
Payer: COMMERCIAL

## 2020-05-16 VITALS — HEART RATE: 84 BPM

## 2020-05-16 DIAGNOSIS — R06.02 SOB (SHORTNESS OF BREATH): Primary | ICD-10-CM

## 2020-05-16 PROCEDURE — 99214 OFFICE O/P EST MOD 30 MIN: CPT | Performed by: FAMILY MEDICINE

## 2020-11-02 ENCOUNTER — TELEMEDICINE (OUTPATIENT)
Dept: FAMILY MEDICINE CLINIC | Facility: CLINIC | Age: 25
End: 2020-11-02
Payer: COMMERCIAL

## 2020-11-02 DIAGNOSIS — Z20.822 EXPOSURE TO COVID-19 VIRUS: ICD-10-CM

## 2020-11-02 DIAGNOSIS — Z20.822 EXPOSURE TO COVID-19 VIRUS: Primary | ICD-10-CM

## 2020-11-02 PROCEDURE — 99213 OFFICE O/P EST LOW 20 MIN: CPT | Performed by: FAMILY MEDICINE

## 2020-11-02 PROCEDURE — U0003 INFECTIOUS AGENT DETECTION BY NUCLEIC ACID (DNA OR RNA); SEVERE ACUTE RESPIRATORY SYNDROME CORONAVIRUS 2 (SARS-COV-2) (CORONAVIRUS DISEASE [COVID-19]), AMPLIFIED PROBE TECHNIQUE, MAKING USE OF HIGH THROUGHPUT TECHNOLOGIES AS DESCRIBED BY CMS-2020-01-R: HCPCS | Performed by: FAMILY MEDICINE

## 2020-11-05 ENCOUNTER — TELEPHONE (OUTPATIENT)
Dept: FAMILY MEDICINE CLINIC | Facility: CLINIC | Age: 25
End: 2020-11-05

## 2020-11-05 LAB — SARS-COV-2 RNA SPEC QL NAA+PROBE: NOT DETECTED

## 2021-05-14 NOTE — TELEPHONE ENCOUNTER
----- Message from Tiffany Rothman MD sent at 2/18/2019 12:23 PM EST -----  Pl, advise pt -  Normal urine culture    Left message for patient to call office Xray Chest 1 View- PORTABLE-Urgent

## 2021-07-02 ENCOUNTER — TELEPHONE (OUTPATIENT)
Dept: FAMILY MEDICINE CLINIC | Facility: CLINIC | Age: 26
End: 2021-07-02

## 2021-07-02 NOTE — TELEPHONE ENCOUNTER
Dr Angela Reddy,    Patient needs blood order for titer to check MMR, varicella, and HBSAB  Please email lab slip to patient

## 2021-07-08 ENCOUNTER — OFFICE VISIT (OUTPATIENT)
Dept: FAMILY MEDICINE CLINIC | Facility: CLINIC | Age: 26
End: 2021-07-08
Payer: COMMERCIAL

## 2021-07-08 VITALS
DIASTOLIC BLOOD PRESSURE: 80 MMHG | WEIGHT: 145 LBS | SYSTOLIC BLOOD PRESSURE: 122 MMHG | TEMPERATURE: 98.8 F | RESPIRATION RATE: 14 BRPM | HEART RATE: 72 BPM | HEIGHT: 65 IN | BODY MASS INDEX: 24.16 KG/M2

## 2021-07-08 DIAGNOSIS — Z00.00 GENERAL MEDICAL EXAM: Primary | ICD-10-CM

## 2021-07-08 DIAGNOSIS — Z23 NEED FOR VACCINATION: ICD-10-CM

## 2021-07-08 PROBLEM — D68.59 HYPERCOAGULABLE STATE (HCC): Status: ACTIVE | Noted: 2020-08-24

## 2021-07-08 PROBLEM — J45.909 ASTHMA: Status: ACTIVE | Noted: 2020-08-24

## 2021-07-08 PROBLEM — N92.0 MENORRHAGIA WITH REGULAR CYCLE: Status: ACTIVE | Noted: 2020-08-24

## 2021-07-08 PROBLEM — D50.0 IRON DEFICIENCY ANEMIA DUE TO CHRONIC BLOOD LOSS: Status: ACTIVE | Noted: 2021-06-06

## 2021-07-08 PROCEDURE — 90746 HEPB VACCINE 3 DOSE ADULT IM: CPT | Performed by: FAMILY MEDICINE

## 2021-07-08 PROCEDURE — 99395 PREV VISIT EST AGE 18-39: CPT | Performed by: FAMILY MEDICINE

## 2021-07-08 PROCEDURE — 90472 IMMUNIZATION ADMIN EACH ADD: CPT | Performed by: FAMILY MEDICINE

## 2021-07-08 PROCEDURE — 3008F BODY MASS INDEX DOCD: CPT | Performed by: FAMILY MEDICINE

## 2021-07-08 PROCEDURE — 90471 IMMUNIZATION ADMIN: CPT | Performed by: FAMILY MEDICINE

## 2021-07-08 PROCEDURE — 1036F TOBACCO NON-USER: CPT | Performed by: FAMILY MEDICINE

## 2021-07-08 PROCEDURE — 90716 VAR VACCINE LIVE SUBQ: CPT | Performed by: FAMILY MEDICINE

## 2021-07-08 PROCEDURE — 3725F SCREEN DEPRESSION PERFORMED: CPT | Performed by: FAMILY MEDICINE

## 2021-07-08 RX ORDER — ASCORBIC ACID 500 MG
1 TABLET ORAL DAILY
COMMUNITY

## 2021-07-08 RX ORDER — FERROUS SULFATE 325(65) MG
1 TABLET ORAL 3 TIMES WEEKLY
COMMUNITY

## 2021-07-08 NOTE — PROGRESS NOTES
Chief Complaint   Patient presents with    Physical Exam        Patient ID: Virgil Nixon is a 27 y o  female  HPI  Pt is seeing for CPE for college     The following portions of the patient's history were reviewed and updated as appropriate: allergies, current medications, past family history, past medical history, past social history, past surgical history and problem list     Review of Systems   Constitutional: Negative for fatigue, fever and unexpected weight change  HENT: Negative for congestion, ear discharge, ear pain, hearing loss, rhinorrhea, sinus pressure, sore throat and trouble swallowing  Eyes: Negative  Respiratory: Negative  Cardiovascular: Positive for leg swelling (chronic L leg swelling from chronic DVT  -  under vascular specialist care )  Negative for chest pain and palpitations  Gastrointestinal: Negative  Endocrine: Negative  Genitourinary: Negative  Musculoskeletal: Negative  Skin: Negative  Neurological: Negative for dizziness, weakness, light-headedness and numbness  Hematological: Negative  Psychiatric/Behavioral: Negative  Current Outpatient Medications   Medication Sig Dispense Refill    rivaroxaban (Xarelto) 20 mg tablet Take 20 mg by mouth      ascorbic acid (VITAMIN C) 500 MG tablet Take 1 tablet by mouth daily      cyanocobalamin ([employer] Vitamin B12) 1000 MCG tablet Take 1 tablet by mouth daily      ferrous sulfate 325 (65 Fe) mg tablet Take 1 tablet by mouth 3 (three) times a week       No current facility-administered medications for this visit  Objective:    /80 (BP Location: Left arm, Patient Position: Sitting, Cuff Size: Standard)   Pulse 72   Temp 98 8 °F (37 1 °C) (Temporal)   Resp 14   Ht 5' 4 5" (1 638 m)   Wt 65 8 kg (145 lb)   BMI 24 50 kg/m²        Physical Exam  Constitutional:       General: She is not in acute distress  Appearance: She is well-developed     HENT:      Head: Normocephalic and atraumatic  Right Ear: Hearing, tympanic membrane, ear canal and external ear normal       Left Ear: Hearing, tympanic membrane, ear canal and external ear normal       Mouth/Throat:      Pharynx: No oropharyngeal exudate or posterior oropharyngeal erythema  Eyes:      Extraocular Movements: Extraocular movements intact  Conjunctiva/sclera: Conjunctivae normal    Neck:      Thyroid: No thyroid mass or thyromegaly  Vascular: No JVD  Cardiovascular:      Rate and Rhythm: Normal rate and regular rhythm  Heart sounds: Normal heart sounds  No murmur heard  No gallop  Pulmonary:      Effort: No respiratory distress  Breath sounds: Normal breath sounds  No wheezing, rhonchi or rales  Abdominal:      General: Bowel sounds are normal       Palpations: Abdomen is soft  Tenderness: There is no abdominal tenderness  Musculoskeletal:      Cervical back: Normal range of motion and neck supple  Right lower leg: No edema  Left lower leg: No edema  Lymphadenopathy:      Cervical: No cervical adenopathy  Skin:     Findings: No erythema or rash  Neurological:      General: No focal deficit present  Mental Status: She is alert and oriented to person, place, and time  Cranial Nerves: No cranial nerve deficit  Psychiatric:         Mood and Affect: Mood normal          Behavior: Behavior normal          Thought Content: Thought content normal          Judgment: Judgment normal            Labs in chart were reviewed  Assessment/Plan:         Diagnoses and all orders for this visit:    General medical exam    Need for vaccination  -     Varicella Vaccine SQ  -     HEPATITIS B VACCINE ADULT IM    Other orders  -     ferrous sulfate 325 (65 Fe) mg tablet; Take 1 tablet by mouth 3 (three) times a week  -     cyanocobalamin ([employer] Vitamin B12) 1000 MCG tablet; Take 1 tablet by mouth daily  -     ascorbic acid (VITAMIN C) 500 MG tablet;  Take 1 tablet by mouth daily rto prn                   David Recinos MD

## 2021-07-12 ENCOUNTER — CLINICAL SUPPORT (OUTPATIENT)
Dept: FAMILY MEDICINE CLINIC | Facility: CLINIC | Age: 26
End: 2021-07-12
Payer: COMMERCIAL

## 2021-07-12 VITALS — TEMPERATURE: 98.7 F

## 2021-07-12 DIAGNOSIS — Z23 NEED FOR TUBERCULOSIS VACCINATION: Primary | ICD-10-CM

## 2021-07-12 PROCEDURE — 86580 TB INTRADERMAL TEST: CPT

## 2021-07-14 ENCOUNTER — CLINICAL SUPPORT (OUTPATIENT)
Dept: FAMILY MEDICINE CLINIC | Facility: CLINIC | Age: 26
End: 2021-07-14

## 2021-07-14 DIAGNOSIS — Z11.1 ENCOUNTER FOR PPD SKIN TEST READING: Primary | ICD-10-CM

## 2021-07-14 LAB
INDURATION: 0 MM
TB SKIN TEST: NEGATIVE

## 2021-07-14 NOTE — PROGRESS NOTES
Patient presents for PPD read  Results read, 0 00mm, negative  Patient is to come back in one week for second part of two step PPD  Patient requested that all documentation be held onto until patient returns  Also, scanned documentation into chart

## 2021-07-21 ENCOUNTER — CLINICAL SUPPORT (OUTPATIENT)
Dept: FAMILY MEDICINE CLINIC | Facility: CLINIC | Age: 26
End: 2021-07-21
Payer: COMMERCIAL

## 2021-07-21 VITALS — TEMPERATURE: 98.2 F

## 2021-07-21 DIAGNOSIS — Z23 NEED FOR TUBERCULOSIS VACCINATION: Primary | ICD-10-CM

## 2021-07-21 PROCEDURE — 86580 TB INTRADERMAL TEST: CPT

## 2021-07-23 ENCOUNTER — CLINICAL SUPPORT (OUTPATIENT)
Dept: FAMILY MEDICINE CLINIC | Facility: CLINIC | Age: 26
End: 2021-07-23

## 2021-07-23 DIAGNOSIS — Z11.1 ENCOUNTER FOR PPD SKIN TEST READING: Primary | ICD-10-CM

## 2021-07-23 LAB
INDURATION: 0 MM
TB SKIN TEST: NEGATIVE

## 2021-08-09 ENCOUNTER — CLINICAL SUPPORT (OUTPATIENT)
Dept: FAMILY MEDICINE CLINIC | Facility: CLINIC | Age: 26
End: 2021-08-09
Payer: COMMERCIAL

## 2021-08-09 DIAGNOSIS — Z23 NEED FOR VACCINATION: Primary | ICD-10-CM

## 2021-08-09 PROCEDURE — 90743 HEPB VACC 2 DOSE ADOLESC IM: CPT

## 2021-08-09 PROCEDURE — 90471 IMMUNIZATION ADMIN: CPT

## 2021-08-09 PROCEDURE — 90716 VAR VACCINE LIVE SUBQ: CPT

## 2021-08-09 PROCEDURE — 90472 IMMUNIZATION ADMIN EACH ADD: CPT

## 2021-12-06 ENCOUNTER — TELEMEDICINE (OUTPATIENT)
Dept: FAMILY MEDICINE CLINIC | Facility: CLINIC | Age: 26
End: 2021-12-06
Payer: COMMERCIAL

## 2021-12-06 VITALS — BODY MASS INDEX: 24.5 KG/M2 | WEIGHT: 145 LBS

## 2021-12-06 DIAGNOSIS — U07.1 COVID-19 VIRUS INFECTION: Primary | ICD-10-CM

## 2021-12-06 PROCEDURE — 99213 OFFICE O/P EST LOW 20 MIN: CPT | Performed by: FAMILY MEDICINE

## 2021-12-06 RX ORDER — COVID-19 TEST SPECIMEN COLLECT
MISCELLANEOUS MISCELLANEOUS
COMMUNITY
Start: 2021-11-11

## 2021-12-08 ENCOUNTER — TELEMEDICINE (OUTPATIENT)
Dept: FAMILY MEDICINE CLINIC | Facility: CLINIC | Age: 26
End: 2021-12-08
Payer: COMMERCIAL

## 2021-12-08 VITALS — BODY MASS INDEX: 24.16 KG/M2 | WEIGHT: 145 LBS | HEIGHT: 65 IN

## 2021-12-08 DIAGNOSIS — U07.1 COVID-19 VIRUS INFECTION: Primary | ICD-10-CM

## 2021-12-08 DIAGNOSIS — Z86.718 HISTORY OF THROMBOEMBOLISM OF VEIN: ICD-10-CM

## 2021-12-08 PROCEDURE — 99213 OFFICE O/P EST LOW 20 MIN: CPT | Performed by: FAMILY MEDICINE

## 2021-12-09 PROBLEM — U07.1 COVID-19 VIRUS INFECTION: Status: ACTIVE | Noted: 2021-12-09

## 2021-12-10 ENCOUNTER — TELEMEDICINE (OUTPATIENT)
Dept: FAMILY MEDICINE CLINIC | Facility: CLINIC | Age: 26
End: 2021-12-10
Payer: COMMERCIAL

## 2021-12-10 VITALS — BODY MASS INDEX: 24.16 KG/M2 | HEIGHT: 65 IN | WEIGHT: 145 LBS

## 2021-12-10 DIAGNOSIS — U07.1 COVID-19 VIRUS INFECTION: Primary | ICD-10-CM

## 2021-12-10 PROBLEM — Z86.718 HISTORY OF THROMBOEMBOLISM OF VEIN: Status: ACTIVE | Noted: 2021-12-10

## 2021-12-10 PROBLEM — R60.9 EDEMA: Status: ACTIVE | Noted: 2021-12-10

## 2021-12-10 PROBLEM — I82.542: Status: ACTIVE | Noted: 2021-12-10

## 2021-12-10 PROCEDURE — 1036F TOBACCO NON-USER: CPT | Performed by: FAMILY MEDICINE

## 2021-12-10 PROCEDURE — 99212 OFFICE O/P EST SF 10 MIN: CPT | Performed by: FAMILY MEDICINE

## 2021-12-10 PROCEDURE — 3008F BODY MASS INDEX DOCD: CPT | Performed by: FAMILY MEDICINE

## 2021-12-21 ENCOUNTER — TELEPHONE (OUTPATIENT)
Dept: FAMILY MEDICINE CLINIC | Facility: CLINIC | Age: 26
End: 2021-12-21

## 2022-01-11 ENCOUNTER — CLINICAL SUPPORT (OUTPATIENT)
Dept: FAMILY MEDICINE CLINIC | Facility: CLINIC | Age: 27
End: 2022-01-11
Payer: COMMERCIAL

## 2022-01-11 DIAGNOSIS — Z23 NEED FOR VACCINATION: Primary | ICD-10-CM

## 2022-01-11 PROCEDURE — 90746 HEPB VACCINE 3 DOSE ADULT IM: CPT

## 2022-01-11 PROCEDURE — 90471 IMMUNIZATION ADMIN: CPT

## 2022-08-22 ENCOUNTER — CLINICAL SUPPORT (OUTPATIENT)
Dept: FAMILY MEDICINE CLINIC | Facility: CLINIC | Age: 27
End: 2022-08-22
Payer: COMMERCIAL

## 2022-08-22 DIAGNOSIS — Z11.1 PPD SCREENING TEST: ICD-10-CM

## 2022-08-22 DIAGNOSIS — Z11.1 ENCOUNTER FOR PPD TEST: Primary | ICD-10-CM

## 2022-08-22 PROCEDURE — 86580 TB INTRADERMAL TEST: CPT

## 2022-08-24 ENCOUNTER — CLINICAL SUPPORT (OUTPATIENT)
Dept: FAMILY MEDICINE CLINIC | Facility: CLINIC | Age: 27
End: 2022-08-24

## 2022-08-24 DIAGNOSIS — Z11.1 ENCOUNTER FOR PPD SKIN TEST READING: Primary | ICD-10-CM

## 2022-08-24 PROCEDURE — RECHECK

## 2023-10-02 NOTE — ED PROVIDER NOTES
Faxed LOV (07/05) notes, nebulizer order, and nebulizer duoneb medication order to Rd at [phone]. Awaiting fax confirmation.    History  Chief Complaint   Patient presents with    Shortness of Breath     Patient presents to the ER with SOB  Concerned that she has a blood clot in her lungs  Patient is febrile and was unware she is febrile  42-year-old female presents with shortness of breath chest pain pleurisy for the past day  She said the shortness of breath is worse on exertion the chest pain is worse when she lies back but better when she sits up  She denies any fevers chills nausea vomiting abdominal pain diarrhea dysuria urgency frequency or any other symptoms  No bilateral lower leg edema noted  No cough or congestion  No headache or neck stiffness noted  Patient has a history of PE use was on anticoagulation and her doctor took her off of it after 6 months  The PE was secondary to oral contraceptive use which she has discontinued since  Nonsmoker  History provided by:  Patient   used: No        None       Past Medical History:   Diagnosis Date    Depression with anxiety 01/07/2012    [facility] [address] )     Headache 01/07/2012    Pulmonary embolism ([facility])     Raynaud disease     TMJ arthralgia     last assessed 04/29/2015       Past Surgical History:   Procedure Laterality Date    NO PAST SURGERIES         Family History   Problem Relation Age of Onset    No Known Problems Mother     No Known Problems Father     Cancer Maternal Grandmother     Hypertension Maternal Grandmother      I have reviewed and agree with the history as documented  Social History   Substance Use Topics    Smoking status: Never Smoker    Smokeless tobacco: Never Used    Alcohol use Yes      Comment: socially        Review of Systems   All other systems reviewed and are negative  Physical Exam  Physical Exam   Constitutional: She is oriented to person, place, and time  She appears well-developed and well-nourished  HENT:   Head: Normocephalic and atraumatic     Eyes: Pupils are equal, round, and reactive to light  EOM are normal    Neck: Normal range of motion  Neck supple  Cardiovascular: Normal rate and regular rhythm  Pulmonary/Chest: Effort normal and breath sounds normal  No respiratory distress  She has no wheezes  She has no rales  She exhibits no tenderness  Abdominal: Soft  Bowel sounds are normal    Musculoskeletal: Normal range of motion  Neurological: She is alert and oriented to person, place, and time  Skin: Skin is warm and dry  Psychiatric: She has a normal mood and affect  Nursing note and vitals reviewed  Vital Signs  ED Triage Vitals   Temperature Pulse Respirations Blood Pressure SpO2   01/26/19 1233 01/26/19 1233 01/26/19 1233 01/26/19 1235 01/26/19 1233   (!) 101 3 °F (38 5 °C) (!) 125 20 113/67 98 %      Temp Source Heart Rate Source Patient Position - Orthostatic VS BP Location FiO2 (%)   01/26/19 1546 01/26/19 1626 01/26/19 1626 01/26/19 1626 --   Tympanic Monitor Lying Right arm       Pain Score       01/26/19 1233       5           Vitals:    01/26/19 1600 01/26/19 1615 01/26/19 1626 01/26/19 1630   BP:   109/60 109/60   Pulse: 68 66 81 82   Patient Position - Orthostatic VS:   Lying        Visual Acuity      ED Medications  Medications   sodium chloride 0 9 % bolus 1,000 mL (0 mL Intravenous Stopped 1/26/19 1511)   ibuprofen (MOTRIN) tablet 600 mg (600 mg Oral Given 1/26/19 1324)   acetaminophen (TYLENOL) tablet 650 mg (650 mg Oral Given 1/26/19 1324)   iohexol (OMNIPAQUE) 350 MG/ML injection (MULTI-DOSE) 75 mL (75 mL Intravenous Given 1/26/19 1435)       Diagnostic Studies  Results Reviewed     Procedure Component Value Units Date/Time    CK Total with Reflex CKMB [[id number]]  (Abnormal) Collected:  01/26/19 1313    Lab Status:  Final result Specimen:  Blood Updated:  01/26/19 1601     Total  (H) U/L     CKMB [[id number]] Collected:  01/26/19 1313    Lab Status:   In process Specimen:  Blood Updated:  01/26/19 1601    Troponin I [[id number]]  (Normal) Collected:  01/26/19 1313    Lab Status:  Final result Specimen:  Blood Updated:  01/26/19 1522     Troponin I <0 02 ng/mL     Urine Microscopic [[id number]]  (Abnormal) Collected:  01/26/19 1313    Lab Status:  Final result Specimen:  Urine from Urine, Clean Catch Updated:  01/26/19 1346     RBC, UA 0-1 (A) /hpf      WBC, UA 2-4 (A) /hpf      Epithelial Cells Moderate (A) /hpf      Bacteria, UA Occasional /hpf     Basic metabolic panel [[id number]] Collected:  01/26/19 1313    Lab Status:  Final result Specimen:  Blood from Arm, Right Updated:  01/26/19 1346     Sodium 138 mmol/L      Potassium 3 8 mmol/L      Chloride 101 mmol/L      CO2 26 mmol/L      ANION GAP 11 mmol/L      BUN 10 mg/dL      Creatinine 0 84 mg/dL      Glucose 109 mg/dL      Calcium 9 5 mg/dL      eGFR 100 ml/min/1 73sq m     Narrative:         National Kidney Disease Education Program recommendations are as follows:  GFR calculation is accurate only with a steady state creatinine  Chronic Kidney disease less than 60 ml/min/1 73 sq  meters  Kidney failure less than 15 ml/min/1 73 sq  meters      Hepatic function panel [[id number]]  (Normal) Collected:  01/26/19 1313    Lab Status:  Final result Specimen:  Blood from Arm, Right Updated:  01/26/19 1346     Total Bilirubin 0 60 mg/dL      Bilirubin, Direct 0 10 mg/dL      Alkaline Phosphatase 75 U/L      AST 32 U/L      ALT 30 U/L      Total Protein 7 1 g/dL      Albumin 4 0 g/dL     Magnesium [[id number]]  (Normal) Collected:  01/26/19 1313    Lab Status:  Final result Specimen:  Blood from Arm, Right Updated:  01/26/19 1346     Magnesium 2 0 mg/dL     Lipase [[id number]]  (Normal) Collected:  01/26/19 1313    Lab Status:  Final result Specimen:  Blood from Arm, Right Updated:  01/26/19 1346     Lipase 106 u/L     UA w Reflex to Microscopic [[id number]]  (Abnormal) Collected:  01/26/19 1313    Lab Status:  Final result Specimen:  Urine from Urine, Clean Catch Updated:  01/26/19 7247 Color, UA Yellow     Clarity, UA Slightly Cloudy     Specific Gravity, UA 1 025     pH, UA 6 0     Leukocytes, UA Trace (A)     Nitrite, UA Negative     Protein,  (2+) (A) mg/dl      Glucose, UA Negative mg/dl      Ketones, UA Negative mg/dl      Urobilinogen, UA 0 2 E U /dl      Bilirubin, UA Negative     Blood, UA Moderate (A)    CBC and differential [[id number]]  (Abnormal) Collected:  01/26/19 1313    Lab Status:  Final result Specimen:  Blood from Arm, Right Updated:  01/26/19 1324     WBC 13 56 (H) Thousand/uL      RBC 4 59 Million/uL      Hemoglobin 14 2 g/dL      Hematocrit 43 1 %      MCV 94 fL      MCH 30 9 pg      MCHC 32 9 g/dL      RDW 11 4 (L) %      MPV 10 7 fL      Platelets 541 Thousands/uL      nRBC 0 /100 WBCs      Neutrophils Relative 87 (H) %      Immat GRANS % 0 %      Lymphocytes Relative 5 (L) %      Monocytes Relative 7 %      Eosinophils Relative 1 %      Basophils Relative 0 %      Neutrophils Absolute 11 77 (H) Thousands/µL      Immature Grans Absolute 0 04 Thousand/uL      Lymphocytes Absolute 0 66 Thousands/µL      Monocytes Absolute 0 96 Thousand/µL      Eosinophils Absolute 0 10 Thousand/µL      Basophils Absolute 0 03 Thousands/µL     POCT pregnancy, urine [[id number]]  (Normal) Resulted:  01/26/19 1320    Lab Status:  Final result Updated:  01/26/19 1324     EXT PREG TEST UR (Ref: Negative) negative                 CTA chest pe study   Final Result by Makenzie Casas MD (01/26 3828)      No pulmonary embolism  No active pulmonary disease                    Workstation performed: [id number]                    Procedures  ECG 12 Lead Documentation  Date/Time: 1/26/2019 1:18 PM  Performed by: Yousif Rizzo by: Kayli García     ECG reviewed by me, the ED Provider: yes    Patient location:  ED  Previous ECG:     Previous ECG:  Unavailable    Comparison to cardiac monitor: Yes    Interpretation:     Interpretation: abnormal    Rate:     ECG rate assessment: tachycardic Rhythm:     Rhythm: sinus rhythm    Ectopy:     Ectopy: none    QRS:     QRS axis:  Normal  Conduction:     Conduction: normal    ST segments:     ST segments:  Non-specific  T waves:     T waves: non-specific               Phone Contacts  ED Phone Contact    ED Course                               MDM  Number of Diagnoses or Management Options  Pericarditis:   Pleurisy:   UTI (urinary tract infection):   Diagnosis management comments: Patient evaluated with labs EKG imaging  I reviewed the results and discussed them with the patient  Patient discharged with appropriate instructions medications and follow-up  Patient verbalized understanding had no further questions at the time of discharge  Patient had stable vital signs and well-appearing at the time of discharge  patient given outpatient prescription for echo for possible pericarditis  Amount and/or Complexity of Data Reviewed  Clinical lab tests: ordered and reviewed  Tests in the radiology section of CPT®: ordered and reviewed  Tests in the medicine section of CPT®: ordered and reviewed    Patient Progress  Patient progress: stable    CritCare Time    Disposition  Final diagnoses:   Pericarditis   Pleurisy   UTI (urinary tract infection)     Time reflects when diagnosis was documented in both MDM as applicable and the Disposition within this note     Time User Action Codes Description Comment    1/26/2019  4:17 PM [email or web address] Add [I31 9] Pericarditis     1/26/2019  4:17 PM Alex Lintha Add [R09 1] Pleurisy     1/26/2019  4:17 PM AnenickuLoli Add [N39 0] UTI (urinary tract infection)       ED Disposition     ED Disposition Condition Comment    Discharge  Aminah Partida discharge to home/self care      Condition at discharge: Stable        Follow-up Information     Follow up With Specialties Details Why Contact Info Additional Information    Bakari Douglas MD Family Medicine Schedule an appointment as soon as possible for a visit  [id number] [facility]  [address] [facility] Emergency Department Emergency Medicine  If symptoms worsen [address] [address]  [phone] [facility], [place], [place], [address]    Saleem Hernandez MD Cardiology Schedule an appointment as soon as possible for a visit  [address]  [address]             Discharge Medication List as of 1/26/2019  4:19 PM      START taking these medications    Details   levofloxacin (LEVAQUIN) 500 mg tablet Take 1 tablet (500 mg total) by mouth daily for 7 days, Starting Sat 1/26/2019, Until Sat 2/2/2019, Print      naproxen (NAPROSYN) 500 mg tablet Take 1 tablet (500 mg total) by mouth 2 (two) times a day with meals for 5 days, Starting Sat 1/26/2019, Until Thu 1/31/2019, Print      traMADol (ULTRAM) 50 mg tablet Take 1 tablet (50 mg total) by mouth every 6 (six) hours as needed for moderate pain for up to 3 days, Starting Sat 1/26/2019, Until Tue 1/29/2019, Print             Outpatient Discharge Orders  Echo complete with contrast if indicated   Standing Status: Future  Standing Exp   Date: 01/26/23         ED Provider  Electronically Signed by           Vipul Reaves DO  01/26/19 6514